# Patient Record
Sex: FEMALE | Race: WHITE | Employment: OTHER | URBAN - METROPOLITAN AREA
[De-identification: names, ages, dates, MRNs, and addresses within clinical notes are randomized per-mention and may not be internally consistent; named-entity substitution may affect disease eponyms.]

---

## 2018-06-07 ENCOUNTER — HOSPITAL ENCOUNTER (OUTPATIENT)
Age: 68
Discharge: HOME | End: 2018-06-09
Payer: MEDICARE

## 2018-06-07 LAB
ALBUMIN SERPL-MCNC: 4.5 G/DL (ref 3.5–5.2)
ALP BLD-CCNC: 55 U/L (ref 35–104)
ALT SERPL-CCNC: 19 U/L (ref 0–32)
ANION GAP SERPL CALCULATED.3IONS-SCNC: 11 MMOL/L (ref 7–16)
AST SERPL-CCNC: 20 U/L (ref 0–31)
BILIRUB SERPL-MCNC: 0.6 MG/DL (ref 0–1.2)
BUN BLDV-MCNC: 17 MG/DL (ref 8–23)
CALCIUM SERPL-MCNC: 9.5 MG/DL (ref 8.6–10.2)
CHLORIDE BLD-SCNC: 102 MMOL/L (ref 98–107)
CHOLESTEROL, TOTAL: 205 MG/DL (ref 0–199)
CO2: 27 MMOL/L (ref 22–29)
CREAT SERPL-MCNC: 0.9 MG/DL (ref 0.5–1)
GFR AFRICAN AMERICAN: >60
GFR NON-AFRICAN AMERICAN: >60 ML/MIN/1.73
GLUCOSE BLD-MCNC: 90 MG/DL (ref 74–109)
HDLC SERPL-MCNC: 66 MG/DL
LDL CHOLESTEROL CALCULATED: 119 MG/DL (ref 0–99)
POTASSIUM SERPL-SCNC: 4.4 MMOL/L (ref 3.5–5)
SODIUM BLD-SCNC: 140 MMOL/L (ref 132–146)
TOTAL PROTEIN: 6.9 G/DL (ref 6.4–8.3)
TRIGL SERPL-MCNC: 101 MG/DL (ref 0–149)
TSH SERPL DL<=0.05 MIU/L-ACNC: 1.58 UIU/ML (ref 0.27–4.2)
VLDLC SERPL CALC-MCNC: 20 MG/DL

## 2018-06-07 PROCEDURE — 85025 COMPLETE CBC W/AUTO DIFF WBC: CPT

## 2018-06-07 PROCEDURE — 36415 COLL VENOUS BLD VENIPUNCTURE: CPT

## 2018-06-07 PROCEDURE — 80053 COMPREHEN METABOLIC PANEL: CPT

## 2018-06-07 PROCEDURE — 85027 COMPLETE CBC AUTOMATED: CPT

## 2018-06-07 PROCEDURE — 80061 LIPID PANEL: CPT

## 2018-06-07 PROCEDURE — 84443 ASSAY THYROID STIM HORMONE: CPT

## 2018-06-09 LAB
BASOPHILS ABSOLUTE: ABNORMAL E9/L (ref 0–0.2)
BASOPHILS RELATIVE PERCENT: ABNORMAL % (ref 0–2)
EOSINOPHILS ABSOLUTE: ABNORMAL E9/L (ref 0.05–0.5)
EOSINOPHILS RELATIVE PERCENT: ABNORMAL % (ref 0–6)
HCT VFR BLD CALC: 38.1 % (ref 34–48)
HEMOGLOBIN: 12.1 G/DL (ref 11.5–15.5)
IMMATURE GRANULOCYTES #: ABNORMAL E9/L
IMMATURE GRANULOCYTES %: ABNORMAL % (ref 0–5)
LYMPHOCYTES ABSOLUTE: ABNORMAL E9/L (ref 1.5–4)
LYMPHOCYTES RELATIVE PERCENT: ABNORMAL % (ref 20–42)
MCH RBC QN AUTO: 31.4 PG (ref 26–35)
MCHC RBC AUTO-ENTMCNC: 31.8 % (ref 32–34.5)
MCV RBC AUTO: 99 FL (ref 80–99.9)
MONOCYTES ABSOLUTE: ABNORMAL E9/L (ref 0.1–0.95)
MONOCYTES RELATIVE PERCENT: ABNORMAL % (ref 2–12)
NEUTROPHILS ABSOLUTE: ABNORMAL E9/L (ref 1.8–7.3)
NEUTROPHILS RELATIVE PERCENT: ABNORMAL % (ref 43–80)
PDW BLD-RTO: 13 FL (ref 11.5–15)
PLATELET # BLD: 207 E9/L (ref 130–450)
PMV BLD AUTO: 10.5 FL (ref 7–12)
RBC # BLD: 3.85 E12/L (ref 3.5–5.5)
WBC # BLD: 3.8 E9/L (ref 4.5–11.5)

## 2018-07-03 ENCOUNTER — HOSPITAL ENCOUNTER (OUTPATIENT)
Dept: NON INVASIVE DIAGNOSTICS | Age: 68
Discharge: HOME | End: 2018-07-03
Payer: MEDICARE

## 2018-07-03 ENCOUNTER — APPOINTMENT (OUTPATIENT)
Dept: NON INVASIVE DIAGNOSTICS | Age: 68
End: 2018-07-03
Payer: MEDICARE

## 2018-07-03 PROCEDURE — 93350 STRESS TTE ONLY: CPT

## 2018-07-03 RX ORDER — ASPIRIN 81 MG/1
81 TABLET, CHEWABLE ORAL DAILY
COMMUNITY
End: 2018-07-26

## 2018-07-10 ENCOUNTER — TELEPHONE (OUTPATIENT)
Dept: CARDIOLOGY CLINIC | Age: 68
End: 2018-07-10

## 2018-07-26 ENCOUNTER — OFFICE VISIT (OUTPATIENT)
Dept: CARDIOLOGY CLINIC | Age: 68
End: 2018-07-26
Payer: MEDICARE

## 2018-07-26 VITALS
HEIGHT: 63 IN | DIASTOLIC BLOOD PRESSURE: 58 MMHG | SYSTOLIC BLOOD PRESSURE: 110 MMHG | WEIGHT: 143 LBS | HEART RATE: 76 BPM | BODY MASS INDEX: 25.34 KG/M2

## 2018-07-26 DIAGNOSIS — R07.89 ATYPICAL CHEST PAIN: Primary | ICD-10-CM

## 2018-07-26 PROCEDURE — 99203 OFFICE O/P NEW LOW 30 MIN: CPT | Performed by: INTERNAL MEDICINE

## 2018-07-26 PROCEDURE — G8419 CALC BMI OUT NRM PARAM NOF/U: HCPCS | Performed by: INTERNAL MEDICINE

## 2018-07-26 PROCEDURE — 3017F COLORECTAL CA SCREEN DOC REV: CPT | Performed by: INTERNAL MEDICINE

## 2018-07-26 PROCEDURE — G8427 DOCREV CUR MEDS BY ELIG CLIN: HCPCS | Performed by: INTERNAL MEDICINE

## 2018-07-26 PROCEDURE — 93000 ELECTROCARDIOGRAM COMPLETE: CPT | Performed by: INTERNAL MEDICINE

## 2018-07-26 PROCEDURE — 1101F PT FALLS ASSESS-DOCD LE1/YR: CPT | Performed by: INTERNAL MEDICINE

## 2018-07-26 PROCEDURE — 1090F PRES/ABSN URINE INCON ASSESS: CPT | Performed by: INTERNAL MEDICINE

## 2018-07-26 RX ORDER — PANTOPRAZOLE SODIUM 40 MG/1
40 TABLET, DELAYED RELEASE ORAL DAILY
COMMUNITY
Start: 2018-07-09 | End: 2020-06-09

## 2018-07-26 NOTE — PROGRESS NOTES
OUTPATIENT CARDIOLOGY CONSULT    Name: Isaura Henry    Age: 79 y.o. Date of Service: 7/26/2018    Reason for Consultation: Atypical chest pain    Referring Physician: Malvin Bhandari D.O. History of Present Illness: The patient is a 53-year-old white female with no known structural heart disease or significant cardiovascular risk factors. She has recently noted randomly occurring precordial chest discomfort with a pressure-like component generally noted without activity and on those occasions noted with association to activity was noted early during the course of her exercise routine with resolution following continued activity and her usual fitness routine involving an elliptical unit or exercise bicycle. On the basis of her symptoms, she recently underwent an exercise stress echocardiogram with a limited exertional tolerance of 2 minutes on accelerated Twan protocol treadmill achieving a functional capacity of 4.6 METs and 100% of age-predicted maximum heart rate with no ischemic symptomatology and a normal electrocardiographic response. A normal resting echocardiogram and appropriate exercise response was noted. Exclusively based on her limited functional capabilities, and intermediate risk and Duke treadmill score was noted with otherwise low risk criteria present. Review of Systems: The remainder of a complete multisystem review including consitutional, central nervous, respiratory, circulatory, gastrointestinal, genitourinary, endocrinologic, hematologic, musculoskeletal and psychiatric are negative.     Past Medical History:  Past Medical History:   Diagnosis Date    Cancer [facility]) 2011    basal cell on nose    Hyperlipidemia     Meniere's disease     Thyroid disease     Ulcerative colitis ([facility] [address]        Past Surgical History:  Past Surgical History:   Procedure Laterality Date    COLONOSCOPY      has had several    COLONOSCOPY  12/17/15    bx done, diverticulosis    COLONOSCOPY by mouth. No current facility-administered medications for this visit. Physical Exam:  BP (!) 110/58   Pulse 76   Ht 5' 3\" (1.6 m)   Wt 143 lb (64.9 kg)   BMI 25.33 kg/m²   Wt Readings from Last 3 Encounters:   07/26/18 143 lb (64.9 kg)   12/07/17 142 lb (64.4 kg)   09/06/17 140 lb (63.5 kg)     The patient is awake, alert and in no discomfort or distress. No gross musculoskeletal deformity or lymphadenopathy are present. No significant skin or nail changes are present. Gross examination of head, eyes, nose and throat are negative. Jugular venous pressure is normal and no carotid bruits are present. No thyromegaly is noted. Normal respiratory effort is noted with no accessory muscle usage present. Lung fields are clear to ascultation. Cardiac examination is notable for a regular rate and rhythm with no palpable thrill. No gallop rhythm or cardiac murmur are identified. A benign abdominal examination is present with no masses or organomegaly. A normal abdominal aortic pulsation is present. Intact pulses are present throughout all extremities and no peripheral edema is present. No focal neurologic deficits are present. Laboratory Tests:  Lab Results   Component Value Date    CREATININE 0.9 06/07/2018    BUN 17 06/07/2018     06/07/2018    K 4.4 06/07/2018     06/07/2018    CO2 27 06/07/2018     No results found for: BNP  Lab Results   Component Value Date    WBC 3.8 06/07/2018    RBC 3.85 06/07/2018    HGB 12.1 06/07/2018    HCT 38.1 06/07/2018    MCV 99.0 06/07/2018    MCH 31.4 06/07/2018    MCHC 31.8 06/07/2018    RDW 13.0 06/07/2018     06/07/2018    MPV 10.5 06/07/2018     No results for input(s): ALKPHOS, ALT, AST, PROT, BILITOT, BILIDIR, LABALBU in the last 72 hours.   No results found for: MG  No results found for: PROTIME, INR  Lab Results   Component Value Date    TSH 1.580 06/07/2018     No components found for: CHLPL  Lab Results   Component Value Date    TRIG 101 06/07/2018    TRIG 139 06/27/2017    TRIG 57 05/20/2016     Lab Results   Component Value Date    HDL 66 06/07/2018    HDL 72 06/27/2017    HDL 69 05/20/2016     Lab Results   Component Value Date    LDLCALC 119 (H) 06/07/2018    LDLCALC 142 (H) 06/27/2017    LDLCALC 120 (H) 05/20/2016       Cardiac Tests:  ECG: A resting electrocardiogram demonstrates evidence of sinus rhythm and is otherwise unremarkable  Last stress test:  An exercise stress echocardiogram demonstrated an exercise tolerance of 2 minutes on accelerated Twan protocol treadmill achieving 4.6 METs of activity and 100% of age-predicted maximum heart rate with atypical and nonischemic chest discomfort provoked with a normal electrocardiographic response. A normal baseline echocardiogram was present with an appropriate exercise echocardiographic response. ASSESSMENT / PLAN:  On a clinical basis, the patient presents with atypically described chest discomfort not suggestive of myocardial ischemia in the face of a limited treadmill exercise tolerance but appropriate tolerance to other exercise modalities without ischemic symptomatology. I feel her poor treadmill performance is likely related to her lack of routine activity of this type) day than suggesting the presence of significant structural heart disease. I have extensively discussed this with her and have recommended her continued activity as tolerance with continued appropriate symptom monitoring and continued appropriate risk factor modification of blood pressure and serum lipids and attempt to reduce risk of future atherosclerotic development. I will presently return her to your care and would happily reassess her in the future should additional cardiovascular difficulties or concerns arise. Follow-up office visit as needed should additional cardiovascular difficulties or concerns arise    Thank you for allowing me to participate in your patient's care.  Please feel free to contact me if

## 2018-09-05 ENCOUNTER — OFFICE VISIT (OUTPATIENT)
Dept: ENT CLINIC | Age: 68
End: 2018-09-05
Payer: MEDICARE

## 2018-09-05 ENCOUNTER — PROCEDURE VISIT (OUTPATIENT)
Dept: AUDIOLOGY | Age: 68
End: 2018-09-05
Payer: MEDICARE

## 2018-09-05 VITALS
HEART RATE: 83 BPM | DIASTOLIC BLOOD PRESSURE: 62 MMHG | WEIGHT: 142 LBS | HEIGHT: 63 IN | BODY MASS INDEX: 25.16 KG/M2 | SYSTOLIC BLOOD PRESSURE: 117 MMHG

## 2018-09-05 DIAGNOSIS — H90.41 SENSORINEURAL HEARING LOSS, UNILATERAL, RIGHT EAR, WITH UNRESTRICTED HEARING ON THE CONTRALATERAL SIDE: ICD-10-CM

## 2018-09-05 DIAGNOSIS — H93.11 TINNITUS AURIUM, RIGHT: ICD-10-CM

## 2018-09-05 DIAGNOSIS — H81.01 MENIERE DISEASE, RIGHT: Primary | ICD-10-CM

## 2018-09-05 DIAGNOSIS — H91.21 SUDDEN HEARING LOSS, RIGHT: Primary | ICD-10-CM

## 2018-09-05 PROCEDURE — 3017F COLORECTAL CA SCREEN DOC REV: CPT | Performed by: OTOLARYNGOLOGY

## 2018-09-05 PROCEDURE — G8399 PT W/DXA RESULTS DOCUMENT: HCPCS | Performed by: OTOLARYNGOLOGY

## 2018-09-05 PROCEDURE — 99213 OFFICE O/P EST LOW 20 MIN: CPT | Performed by: OTOLARYNGOLOGY

## 2018-09-05 PROCEDURE — G8419 CALC BMI OUT NRM PARAM NOF/U: HCPCS | Performed by: OTOLARYNGOLOGY

## 2018-09-05 PROCEDURE — 1090F PRES/ABSN URINE INCON ASSESS: CPT | Performed by: OTOLARYNGOLOGY

## 2018-09-05 PROCEDURE — 92557 COMPREHENSIVE HEARING TEST: CPT | Performed by: AUDIOLOGIST

## 2018-09-05 PROCEDURE — 4040F PNEUMOC VAC/ADMIN/RCVD: CPT | Performed by: OTOLARYNGOLOGY

## 2018-09-05 PROCEDURE — 1101F PT FALLS ASSESS-DOCD LE1/YR: CPT | Performed by: OTOLARYNGOLOGY

## 2018-09-05 PROCEDURE — 1036F TOBACCO NON-USER: CPT | Performed by: OTOLARYNGOLOGY

## 2018-09-05 PROCEDURE — G8427 DOCREV CUR MEDS BY ELIG CLIN: HCPCS | Performed by: OTOLARYNGOLOGY

## 2018-09-05 PROCEDURE — 1123F ACP DISCUSS/DSCN MKR DOCD: CPT | Performed by: OTOLARYNGOLOGY

## 2018-09-05 PROCEDURE — 92567 TYMPANOMETRY: CPT | Performed by: AUDIOLOGIST

## 2018-09-05 RX ORDER — FUROSEMIDE 20 MG/1
20 TABLET ORAL DAILY
Qty: 60 TABLET | Refills: 3 | Status: SHIPPED
Start: 2018-09-05 | End: 2020-06-09

## 2018-09-05 NOTE — PROGRESS NOTES
This patient was referred for audiometric/tympanometric testing by Dr. Dharmesh López. Patient is being seen for her yearly ENT/Audiology consult, due to a sudden, unilateral hearing loss, with tinnitus and vertigo, right ear. Patient denied any significant changes to her symptoms, since the last test date. She is currently wearing a CHANG hearing aid, right ear, and is not having any issues, at this time. Audiometry revealed normal hearing sensitivity, through the frequency range, left ear and a moderate sensorineural hearing loss, right ear. Reliability was good. Speech reception thresholds were in good agreement with the pure tone averages, bilaterally. Speech discrimination scores were 100%, at 50dBHL, left ear and 65dBHL, right ear. Tympanometry revealed normal middle ear peak pressure and compliance, bilaterally. Ipsilateral acoustic reflexes were absent, bilaterally at 1000Hz. The results were reviewed with the patient. Recommendations for follow up will be made pending physician consult.     Shereen Monge

## 2018-09-22 ASSESSMENT — ENCOUNTER SYMPTOMS
SHORTNESS OF BREATH: 0
BLURRED VISION: 0
DOUBLE VISION: 0
COUGH: 0
HEARTBURN: 0
VOMITING: 0

## 2018-09-22 NOTE — PROGRESS NOTES
Units by mouth daily. , Disp: , Rfl:     Calcium Carbonate-Vitamin D (CALCIUM-VITAMIN D) 500-200 MG-UNIT per tablet, Take 1 tablet by mouth daily , Disp: , Rfl:     Omega-3 Fatty Acids (OMEGA 3 PO), Take  by mouth., Disp: , Rfl:   Phenergan [promethazine hcl]; Risedronate; Sulfa antibiotics; Tetanus toxoids; and Demerol hcl [meperidine]  Social History   Substance Use Topics    Smoking status: Former Smoker     Quit date: 1/28/2000    Smokeless tobacco: Never Used    Alcohol use Yes      Comment: .5 beer weekly. 2 cups coffee per day     Family History   Problem Relation Age of Onset    Cancer Mother        Review of Systems   Constitutional: Negative for chills and fever. HENT: Positive for hearing loss and tinnitus. Negative for ear discharge. Eyes: Negative for blurred vision and double vision. Respiratory: Negative for cough and shortness of breath. Cardiovascular: Negative for chest pain and palpitations. Gastrointestinal: Negative for heartburn and vomiting. Skin: Negative for itching and rash. Neurological: Negative for dizziness, tingling and headaches. Endo/Heme/Allergies: Negative for environmental allergies. Does not bruise/bleed easily. All other systems reviewed and are negative. /62 (Site: Left Arm, Position: Sitting, Cuff Size: Medium Adult)   Pulse 83   Ht 5' 3\" (1.6 m)   Wt 142 lb (64.4 kg)   BMI 25.15 kg/m²   Physical Exam   Constitutional: She is oriented to person, place, and time. She appears well-developed and well-nourished. HENT:   Head: Normocephalic and atraumatic. Right Ear: Hearing, tympanic membrane and ear canal normal. No drainage. Left Ear: Hearing, tympanic membrane and ear canal normal. No drainage. Nose: No mucosal edema, septal deviation or nasal septal hematoma. Right sinus exhibits no maxillary sinus tenderness. Left sinus exhibits no maxillary sinus tenderness. Mouth/Throat: Uvula is midline.  No dental abscesses, uvula swelling or dental caries. No oropharyngeal exudate or posterior oropharyngeal erythema. Eyes: Pupils are equal, round, and reactive to light. Conjunctivae and EOM are normal.   Neck: Normal range of motion. Neck supple. Cardiovascular: Normal rate and intact distal pulses. Pulmonary/Chest: Effort normal and breath sounds normal. No respiratory distress. Abdominal: She exhibits no distension. There is no tenderness. There is no guarding. Musculoskeletal: Normal range of motion. Neurological: She is alert and oriented to person, place, and time. Skin: Skin is warm and dry. Psychiatric: She has a normal mood and affect. Her behavior is normal. Judgment and thought content normal.   Nursing note and vitals reviewed. IMPRESSION/PLAN:  History of right-sided unilateral significant hearing loss, previous workup negative for intracranial pathology. Stable hearing loss from a year prior. Continue your protection, proper ear care was reviewed, unfortunatelyno frequent medical therapy for tinnitus at this time however she may continue to consider formal hearing aids. Dr. Brenda Carrasquillo.  Otolaryngology Facial Plastic Surgery  :  [facility] Otolaryngology/Facial Plastic Surgery Residency  Associate Clinical Professor:  Clif Scales, [facility]

## 2018-10-11 ENCOUNTER — HOSPITAL ENCOUNTER (OUTPATIENT)
Dept: GENERAL RADIOLOGY | Age: 68
Discharge: HOME | End: 2018-10-13
Payer: MEDICARE

## 2018-10-11 DIAGNOSIS — Z12.31 VISIT FOR SCREENING MAMMOGRAM: ICD-10-CM

## 2018-10-11 PROCEDURE — 77063 BREAST TOMOSYNTHESIS BI: CPT

## 2019-06-11 ENCOUNTER — OFFICE VISIT (OUTPATIENT)
Dept: ENT CLINIC | Age: 69
End: 2019-06-11
Payer: MEDICARE

## 2019-06-11 VITALS
BODY MASS INDEX: 25.62 KG/M2 | DIASTOLIC BLOOD PRESSURE: 60 MMHG | HEART RATE: 98 BPM | WEIGHT: 144.6 LBS | SYSTOLIC BLOOD PRESSURE: 136 MMHG | HEIGHT: 63 IN

## 2019-06-11 DIAGNOSIS — H81.01 MENIERE DISEASE, RIGHT: Primary | ICD-10-CM

## 2019-06-11 PROCEDURE — G8419 CALC BMI OUT NRM PARAM NOF/U: HCPCS | Performed by: OTOLARYNGOLOGY

## 2019-06-11 PROCEDURE — 1090F PRES/ABSN URINE INCON ASSESS: CPT | Performed by: OTOLARYNGOLOGY

## 2019-06-11 PROCEDURE — 1123F ACP DISCUSS/DSCN MKR DOCD: CPT | Performed by: OTOLARYNGOLOGY

## 2019-06-11 PROCEDURE — 3017F COLORECTAL CA SCREEN DOC REV: CPT | Performed by: OTOLARYNGOLOGY

## 2019-06-11 PROCEDURE — 1036F TOBACCO NON-USER: CPT | Performed by: OTOLARYNGOLOGY

## 2019-06-11 PROCEDURE — 4040F PNEUMOC VAC/ADMIN/RCVD: CPT | Performed by: OTOLARYNGOLOGY

## 2019-06-11 PROCEDURE — G8427 DOCREV CUR MEDS BY ELIG CLIN: HCPCS | Performed by: OTOLARYNGOLOGY

## 2019-06-11 PROCEDURE — 99213 OFFICE O/P EST LOW 20 MIN: CPT | Performed by: OTOLARYNGOLOGY

## 2019-06-11 PROCEDURE — G8399 PT W/DXA RESULTS DOCUMENT: HCPCS | Performed by: OTOLARYNGOLOGY

## 2019-06-11 RX ORDER — VITAMIN B COMPLEX
TABLET ORAL
COMMUNITY

## 2019-06-11 RX ORDER — METRONIDAZOLE 500 MG/1
500 TABLET ORAL 3 TIMES DAILY
COMMUNITY
End: 2020-06-09

## 2019-06-11 RX ORDER — AMOXICILLIN AND CLAVULANATE POTASSIUM 875; 125 MG/1; MG/1
1 TABLET, FILM COATED ORAL 2 TIMES DAILY
COMMUNITY
End: 2020-06-09

## 2019-06-11 ASSESSMENT — ENCOUNTER SYMPTOMS
VOMITING: 0
SHORTNESS OF BREATH: 0
HEARTBURN: 0
BLURRED VISION: 0
COUGH: 0
DOUBLE VISION: 0

## 2019-06-11 NOTE — PROGRESS NOTES
[facility] Otolaryngology  Dr. Moises Delgado. Ms.Ed        Patient Name:  Ruperto Barber  :  1950     CHIEF C/O:    Chief Complaint   Patient presents with    Follow-up     6 mo menieres; ringing and dizziness not getting any better       HISTORY OBTAINED FROM:  patient    HISTORY OF PRESENT ILLNESS:       Avery Vasquez is a 81y.o. year old female, here today for follow up of History of unilateral right-sided hearing loss worked up previously, here for 1 year follow up she states she has continued tinnitus without any changes in hearing loss. Her complaints of vertigo, no current complaints of facial tingling or numbness, no associated facial weakness. She notes continued disequilibrium and roaring tinnitus R worse than L. She notes that reading is a helpful distractor for her. Last hearing test in the 2018.        Past Medical History:   Diagnosis Date    Cancer [facility])     basal cell on nose    Hyperlipidemia     Meniere's disease     Thyroid disease     Ulcerative colitis ([facility] [address]      Past Surgical History:   Procedure Laterality Date    COLONOSCOPY      has had several    COLONOSCOPY  12/17/15    bx done, diverticulosis    COLONOSCOPY  2017    multiple biopsy    HERNIA REPAIR      umbilical    SKIN CANCER EXCISION      nose    TONSILLECTOMY      TUBAL LIGATION         Current Outpatient Medications:     metroNIDAZOLE (FLAGYL) 500 MG tablet, Take 500 mg by mouth 3 times daily, Disp: , Rfl:     amoxicillin-clavulanate (AUGMENTIN) 875-125 MG per tablet, Take 1 tablet by mouth 2 times daily, Disp: , Rfl:     Coenzyme Q10 (COQ10) 100 MG CAPS, Take by mouth, Disp: , Rfl:     Red Yeast Rice Extract (RED YEAST RICE PO), Take 2 tablets by mouth daily, Disp: , Rfl:     Multiple Vitamins-Minerals (MULTIVITAMIN ADULTS 50+) TABS, Take 1 tablet by mouth daily, Disp: , Rfl:     thyroid (ARMOUR) 60 MG tablet, Take 60 mg by mouth daily, Disp: , Rfl:     mesalamine (LIALDA) 1.2 G EC tablet, Take 1,200 mg by mouth daily (with breakfast), Disp: , Rfl:     Probiotic Product (PROBIOTIC DAILY PO), Take by mouth, Disp: , Rfl:     folic acid (FOLVITE) 1 MG tablet, Take 1 mg by mouth daily. , Disp: , Rfl:     Calcium Carbonate-Vitamin D (CALCIUM-VITAMIN D) 500-200 MG-UNIT per tablet, Take 1 tablet by mouth daily , Disp: , Rfl:     Omega-3 Fatty Acids (OMEGA 3 PO), Take  by mouth., Disp: , Rfl:     furosemide (LASIX) 20 MG tablet, Take 1 tablet by mouth daily, Disp: 60 tablet, Rfl: 3    pantoprazole (PROTONIX) 40 MG tablet, 40 mg daily , Disp: , Rfl:     vitamin E 400 UNIT capsule, Take 400 Units by mouth daily. , Disp: , Rfl:   Phenergan [promethazine hcl]; Risedronate; Sulfa antibiotics; Tetanus toxoids; and Demerol hcl [meperidine]  Social History     Tobacco Use    Smoking status: Former Smoker     Last attempt to quit: 2000     Years since quittin.3    Smokeless tobacco: Never Used   Substance Use Topics    Alcohol use: Yes     Comment: .5 beer weekly. 2 cups coffee per day    Drug use: No     Family History   Problem Relation Age of Onset    Cancer Mother        Review of Systems   Constitutional: Negative for chills and fever. HENT: Positive for hearing loss and tinnitus. Negative for ear discharge. Eyes: Negative for blurred vision and double vision. Respiratory: Negative for cough and shortness of breath. Cardiovascular: Negative for chest pain and palpitations. Gastrointestinal: Negative for heartburn and vomiting. Skin: Negative for itching and rash. Neurological: Negative for dizziness, tingling and headaches. Endo/Heme/Allergies: Negative for environmental allergies. Does not bruise/bleed easily. All other systems reviewed and are negative.       /60 (Site: Right Upper Arm, Position: Sitting, Cuff Size: Medium Adult)   Pulse 98   Ht 5' 3\" (1.6 m)   Wt 144 lb 9.6 oz (65.6 kg)   BMI 25.61 kg/m²   Physical Exam   Constitutional: She is oriented to person, place, and time. She appears well-developed and well-nourished. HENT:   Head: Normocephalic and atraumatic. Right Ear: Hearing, tympanic membrane and ear canal normal. No drainage. Left Ear: Hearing, tympanic membrane and ear canal normal. No drainage. Nose: No mucosal edema, septal deviation or nasal septal hematoma. Right sinus exhibits no maxillary sinus tenderness. Left sinus exhibits no maxillary sinus tenderness. Mouth/Throat: Uvula is midline. No dental abscesses, uvula swelling or dental caries. No oropharyngeal exudate or posterior oropharyngeal erythema. Eyes: Pupils are equal, round, and reactive to light. Conjunctivae and EOM are normal.   Neck: Normal range of motion. Neck supple. Cardiovascular: Normal rate and intact distal pulses. Pulmonary/Chest: Effort normal and breath sounds normal. No respiratory distress. Abdominal: She exhibits no distension. There is no tenderness. There is no guarding. Musculoskeletal: Normal range of motion. Neurological: She is alert and oriented to person, place, and time. Skin: Skin is warm and dry. Psychiatric: She has a normal mood and affect. Her behavior is normal. Judgment and thought content normal.   Nursing note and vitals reviewed. IMPRESSION/PLAN:  R Meniere's Disease   Repeat audio this week as patient's hearing has changed  If hearing has decreased with again prescribe trial of Lasix   Continue distraction techniques for tinnitus   Follow up in 6 months  Discussed with Dr. Rolo ortega     Electronically signed by Frankie Singer DO on 6/11/2019 at 11:17 AM            Kathleen Gupta  1950      I have discussed the case, including pertinent history and exam findings with the resident. I have seen and examined the patient and the key elements of the encounter have been performed by me. I agree with the assessment, plan and orders as documented by the resident.       Patient here for follow up of medical problems. Remainder of medical problems as per resident note.       [address] [facility], DO  6/25/19

## 2019-06-13 ENCOUNTER — PROCEDURE VISIT (OUTPATIENT)
Dept: AUDIOLOGY | Age: 69
End: 2019-06-13
Payer: MEDICARE

## 2019-06-13 DIAGNOSIS — H91.21 SUDDEN HEARING LOSS, RIGHT: ICD-10-CM

## 2019-06-13 DIAGNOSIS — H90.41 SENSORINEURAL HEARING LOSS (SNHL) OF RIGHT EAR WITH UNRESTRICTED HEARING OF LEFT EAR: Primary | ICD-10-CM

## 2019-06-13 DIAGNOSIS — H81.01 MENIERE'S DISEASE OF RIGHT EAR: ICD-10-CM

## 2019-06-13 PROCEDURE — 92557 COMPREHENSIVE HEARING TEST: CPT | Performed by: AUDIOLOGIST

## 2019-06-13 PROCEDURE — 92567 TYMPANOMETRY: CPT | Performed by: AUDIOLOGIST

## 2019-06-13 NOTE — PROGRESS NOTES
This patient was referred for audiometric/tympanometric testing by Dr. Analy Baires due to hearing loss. She denied any change in hearing since previous test. She reported using a hearing aid on the right. Audiometry revealed a moderate rising to essentially mild sensorineural hearing loss on the right and hearing sensitivity within normal limits in the left. Reliability was good. Speech reception thresholds were in good agreement with the pure tone averages, bilaterally. Speech discrimination scores were excellent, bilaterally. No significant change from previous test, 9/5/2018. Tympanometry revealed normal middle ear peak pressure and compliance, bilaterally. The results were reviewed with the patient. Recommendations for follow up will be made pending physician consult. Marie Emerson.   [phone] Audiology Doctoral Student

## 2019-06-14 DIAGNOSIS — H81.01 MENIERE'S DISEASE (COCHLEAR HYDROPS), RIGHT: Primary | ICD-10-CM

## 2019-10-14 ENCOUNTER — HOSPITAL ENCOUNTER (OUTPATIENT)
Dept: GENERAL RADIOLOGY | Age: 69
Discharge: HOME | End: 2019-10-16
Payer: MEDICARE

## 2019-10-14 DIAGNOSIS — Z12.31 VISIT FOR SCREENING MAMMOGRAM: ICD-10-CM

## 2019-10-14 PROCEDURE — 77063 BREAST TOMOSYNTHESIS BI: CPT

## 2020-06-08 ENCOUNTER — HOSPITAL ENCOUNTER (OUTPATIENT)
Age: 70
Discharge: HOME | End: 2020-06-10
Payer: MEDICARE

## 2020-06-08 LAB
ALBUMIN SERPL-MCNC: 4.6 G/DL (ref 3.5–5.2)
ALP BLD-CCNC: 53 U/L (ref 35–104)
ALT SERPL-CCNC: 25 U/L (ref 0–32)
ANION GAP SERPL CALCULATED.3IONS-SCNC: 11 MMOL/L (ref 7–16)
AST SERPL-CCNC: 24 U/L (ref 0–31)
BILIRUB SERPL-MCNC: 0.3 MG/DL (ref 0–1.2)
BUN BLDV-MCNC: 12 MG/DL (ref 8–23)
CALCIUM SERPL-MCNC: 9.6 MG/DL (ref 8.6–10.2)
CHLORIDE BLD-SCNC: 106 MMOL/L (ref 98–107)
CHOLESTEROL, TOTAL: 230 MG/DL (ref 0–199)
CO2: 26 MMOL/L (ref 22–29)
CREAT SERPL-MCNC: 0.9 MG/DL (ref 0.5–1)
GFR AFRICAN AMERICAN: >60
GFR NON-AFRICAN AMERICAN: >60 ML/MIN/1.73
GLUCOSE BLD-MCNC: 94 MG/DL (ref 74–99)
HCT VFR BLD CALC: 36.8 % (ref 34–48)
HDLC SERPL-MCNC: 52 MG/DL
HEMOGLOBIN: 11.5 G/DL (ref 11.5–15.5)
LDL CHOLESTEROL CALCULATED: 131 MG/DL (ref 0–99)
MCH RBC QN AUTO: 31.3 PG (ref 26–35)
MCHC RBC AUTO-ENTMCNC: 31.3 % (ref 32–34.5)
MCV RBC AUTO: 100 FL (ref 80–99.9)
PDW BLD-RTO: 13 FL (ref 11.5–15)
PLATELET # BLD: 192 E9/L (ref 130–450)
PMV BLD AUTO: 10.9 FL (ref 7–12)
POTASSIUM SERPL-SCNC: 4.6 MMOL/L (ref 3.5–5)
RBC # BLD: 3.68 E12/L (ref 3.5–5.5)
SODIUM BLD-SCNC: 143 MMOL/L (ref 132–146)
TOTAL PROTEIN: 7.1 G/DL (ref 6.4–8.3)
TRIGL SERPL-MCNC: 236 MG/DL (ref 0–149)
TSH SERPL DL<=0.05 MIU/L-ACNC: 2.19 UIU/ML (ref 0.27–4.2)
VLDLC SERPL CALC-MCNC: 47 MG/DL
WBC # BLD: 4.3 E9/L (ref 4.5–11.5)

## 2020-06-08 PROCEDURE — 80061 LIPID PANEL: CPT

## 2020-06-08 PROCEDURE — 80053 COMPREHEN METABOLIC PANEL: CPT

## 2020-06-08 PROCEDURE — 84443 ASSAY THYROID STIM HORMONE: CPT

## 2020-06-08 PROCEDURE — 85027 COMPLETE CBC AUTOMATED: CPT

## 2020-06-08 PROCEDURE — U0003 INFECTIOUS AGENT DETECTION BY NUCLEIC ACID (DNA OR RNA); SEVERE ACUTE RESPIRATORY SYNDROME CORONAVIRUS 2 (SARS-COV-2) (CORONAVIRUS DISEASE [COVID-19]), AMPLIFIED PROBE TECHNIQUE, MAKING USE OF HIGH THROUGHPUT TECHNOLOGIES AS DESCRIBED BY CMS-2020-01-R: HCPCS

## 2020-06-08 PROCEDURE — 36415 COLL VENOUS BLD VENIPUNCTURE: CPT

## 2020-06-10 LAB
SARS-COV-2: NOT DETECTED
SOURCE: NORMAL

## 2020-06-11 ENCOUNTER — ANESTHESIA EVENT (OUTPATIENT)
Dept: ENDOSCOPY | Age: 70
End: 2020-06-11
Payer: MEDICARE

## 2020-06-11 ENCOUNTER — HOSPITAL ENCOUNTER (OUTPATIENT)
Age: 70
Setting detail: OUTPATIENT SURGERY
Discharge: HOME | End: 2020-06-11
Attending: INTERNAL MEDICINE | Admitting: INTERNAL MEDICINE
Payer: MEDICARE

## 2020-06-11 ENCOUNTER — ANESTHESIA (OUTPATIENT)
Dept: ENDOSCOPY | Age: 70
End: 2020-06-11
Payer: MEDICARE

## 2020-06-11 VITALS
HEIGHT: 63 IN | SYSTOLIC BLOOD PRESSURE: 111 MMHG | DIASTOLIC BLOOD PRESSURE: 53 MMHG | OXYGEN SATURATION: 98 % | WEIGHT: 139 LBS | RESPIRATION RATE: 16 BRPM | BODY MASS INDEX: 24.63 KG/M2 | TEMPERATURE: 97.8 F | HEART RATE: 80 BPM

## 2020-06-11 VITALS
SYSTOLIC BLOOD PRESSURE: 112 MMHG | RESPIRATION RATE: 16 BRPM | OXYGEN SATURATION: 100 % | DIASTOLIC BLOOD PRESSURE: 64 MMHG

## 2020-06-11 PROCEDURE — 88305 TISSUE EXAM BY PATHOLOGIST: CPT

## 2020-06-11 PROCEDURE — 2580000003 HC RX 258: Performed by: ANESTHESIOLOGY

## 2020-06-11 PROCEDURE — 3700000000 HC ANESTHESIA ATTENDED CARE: Performed by: INTERNAL MEDICINE

## 2020-06-11 PROCEDURE — 6360000002 HC RX W HCPCS: Performed by: NURSE ANESTHETIST, CERTIFIED REGISTERED

## 2020-06-11 PROCEDURE — 3700000001 HC ADD 15 MINUTES (ANESTHESIA): Performed by: INTERNAL MEDICINE

## 2020-06-11 PROCEDURE — 3609010300 HC COLONOSCOPY W/BIOPSY SINGLE/MULTIPLE: Performed by: INTERNAL MEDICINE

## 2020-06-11 PROCEDURE — 2709999900 HC NON-CHARGEABLE SUPPLY: Performed by: INTERNAL MEDICINE

## 2020-06-11 PROCEDURE — 7100000010 HC PHASE II RECOVERY - FIRST 15 MIN: Performed by: INTERNAL MEDICINE

## 2020-06-11 PROCEDURE — 7100000011 HC PHASE II RECOVERY - ADDTL 15 MIN: Performed by: INTERNAL MEDICINE

## 2020-06-11 RX ORDER — SODIUM CHLORIDE 0.9 % (FLUSH) 0.9 %
10 SYRINGE (ML) INJECTION EVERY 12 HOURS SCHEDULED
Status: DISCONTINUED | OUTPATIENT
Start: 2020-06-11 | End: 2020-06-11 | Stop reason: HOSPADM

## 2020-06-11 RX ORDER — PROPOFOL 10 MG/ML
INJECTION, EMULSION INTRAVENOUS CONTINUOUS PRN
Status: DISCONTINUED | OUTPATIENT
Start: 2020-06-11 | End: 2020-06-11 | Stop reason: SDUPTHER

## 2020-06-11 RX ORDER — SODIUM CHLORIDE, SODIUM LACTATE, POTASSIUM CHLORIDE, CALCIUM CHLORIDE 600; 310; 30; 20 MG/100ML; MG/100ML; MG/100ML; MG/100ML
INJECTION, SOLUTION INTRAVENOUS CONTINUOUS
Status: DISCONTINUED | OUTPATIENT
Start: 2020-06-11 | End: 2020-06-11 | Stop reason: HOSPADM

## 2020-06-11 RX ORDER — SODIUM CHLORIDE 0.9 % (FLUSH) 0.9 %
10 SYRINGE (ML) INJECTION PRN
Status: DISCONTINUED | OUTPATIENT
Start: 2020-06-11 | End: 2020-06-11 | Stop reason: HOSPADM

## 2020-06-11 RX ADMIN — PROPOFOL 100 MCG/KG/MIN: 10 INJECTION, EMULSION INTRAVENOUS at 07:52

## 2020-06-11 RX ADMIN — SODIUM CHLORIDE, POTASSIUM CHLORIDE, SODIUM LACTATE AND CALCIUM CHLORIDE: 600; 310; 30; 20 INJECTION, SOLUTION INTRAVENOUS at 07:39

## 2020-06-11 ASSESSMENT — PAIN SCALES - GENERAL
PAINLEVEL_OUTOF10: 0
PAINLEVEL_OUTOF10: 0

## 2020-06-11 ASSESSMENT — PAIN - FUNCTIONAL ASSESSMENT: PAIN_FUNCTIONAL_ASSESSMENT: 0-10

## 2020-06-11 NOTE — ANESTHESIA PRE PROCEDURE
Department of Anesthesiology  Preprocedure Note       Name:  Eneida Baca   Age:  78 y.o.  :  1950                                          MRN:  [id number]         Date:  2020      Surgeon: Chon Cruz):  Tiffani Isbell MD    Procedure: Procedure(s):  COLONOSCOPY    Medications prior to admission:   Prior to Admission medications    Medication Sig Start Date End Date Taking? Authorizing Provider   Coenzyme Q10 (COQ10) 100 MG CAPS Take by mouth    Historical Provider, MD   Red Yeast Rice Extract (RED YEAST RICE PO) Take 2 tablets by mouth daily    Historical Provider, MD   Multiple Vitamins-Minerals (MULTIVITAMIN ADULTS 50+) TABS Take 1 tablet by mouth daily    Historical Provider, MD   thyroid (ARMOUR) 60 MG tablet Take 60 mg by mouth daily    Historical Provider, MD   mesalamine (LIALDA) 1.2 G EC tablet Take 1,200 mg by mouth daily (with breakfast)    Historical Provider, MD   Probiotic Product (PROBIOTIC DAILY PO) Take by mouth    Historical Provider, MD   folic acid (FOLVITE) 1 MG tablet Take 1 mg by mouth daily. Historical Provider, MD   vitamin E 400 UNIT capsule Take 400 Units by mouth daily. Historical Provider, MD   Calcium Carbonate-Vitamin D (CALCIUM-VITAMIN D) 500-200 MG-UNIT per tablet Take 1 tablet by mouth daily     Historical Provider, MD   Omega-3 Fatty Acids (OMEGA 3 PO) Take  by mouth. Historical Provider, MD       Current medications:    No current facility-administered medications for this encounter.       Current Outpatient Medications   Medication Sig Dispense Refill    Coenzyme Q10 (COQ10) 100 MG CAPS Take by mouth      Red Yeast Rice Extract (RED YEAST RICE PO) Take 2 tablets by mouth daily      Multiple Vitamins-Minerals (MULTIVITAMIN ADULTS 50+) TABS Take 1 tablet by mouth daily      thyroid (ARMOUR) 60 MG tablet Take 60 mg by mouth daily      mesalamine (LIALDA) 1.2 G EC tablet Take 1,200 mg by mouth daily (with breakfast)      Probiotic Product (PROBIOTIC DAILY PO) Take by mouth      folic acid (FOLVITE) 1 MG tablet Take 1 mg by mouth daily.  vitamin E 400 UNIT capsule Take 400 Units by mouth daily.  Calcium Carbonate-Vitamin D (CALCIUM-VITAMIN D) 500-200 MG-UNIT per tablet Take 1 tablet by mouth daily       Omega-3 Fatty Acids (OMEGA 3 PO) Take  by mouth. Allergies: Allergies   Allergen Reactions    Phenergan [Promethazine Hcl] Other (See Comments)     Developed a phlebitis after IV administration    Risedronate      joint pain    Sulfa Antibiotics Hives    Tetanus Toxoids Hives    Demerol Hcl [Meperidine] Nausea And Vomiting     After pt had IV demerol became very nauseated with vomiting       Problem List:    Patient Active Problem List   Diagnosis Code    Atypical chest pain R07.89       Past Medical History:        Diagnosis Date    Cancer ([facility] [address] 2011    basal cell on nose    Hyperlipidemia     Meniere's disease     Thyroid disease     Ulcerative colitis ([facility] [address]        Past Surgical History:        Procedure Laterality Date    COLONOSCOPY      has had several    COLONOSCOPY  12/17/15    bx done, diverticulosis    COLONOSCOPY  2017    multiple biopsy    HERNIA REPAIR  1006    umbilical    SKIN CANCER EXCISION      nose    TONSILLECTOMY      TUBAL LIGATION         Social History:    Social History     Tobacco Use    Smoking status: Former Smoker     Last attempt to quit: 2000     Years since quittin.3    Smokeless tobacco: Never Used   Substance Use Topics    Alcohol use: Yes     Comment: .5 beer weekly.  2 cups coffee per day                                Counseling given: Not Answered      Vital Signs (Current):   Vitals:    20 1013   Weight: 137 lb (62.1 kg)   Height: 5' 3\" (1.6 m)                                              BP Readings from Last 3 Encounters:   19 136/60   18 117/62   18 (!) 110/58       NPO Status:

## 2020-06-11 NOTE — ANESTHESIA POSTPROCEDURE EVALUATION
Department of Anesthesiology  Postprocedure Note    Patient: Yuli Hill  MRN: [id number]  YOB: 1950  Date of evaluation: 6/11/2020  Time:  8:22 AM     Procedure Summary     Date:  06/11/20 Room / Location:  [address] [place] [address] / [address] [place] [address]    Anesthesia Start:  1108 Anesthesia Stop:  8603    Procedure:  COLONOSCOPY WITH BIOPSY (N/A ) Diagnosis:  (ULCERATIVE COLITIS)    Surgeon:  Baljit Wilson MD Responsible Provider:  Annette Holland MD    Anesthesia Type:  MAC ASA Status:  3          Anesthesia Type: MAC    Saniya Phase I: Saniya Score: 10    Saniya Phase II:      Last vitals: Reviewed and per EMR flowsheets.        Anesthesia Post Evaluation    Patient location during evaluation: PACU  Patient participation: complete - patient participated  Level of consciousness: awake  Pain score: 0  Airway patency: patent  Nausea & Vomiting: no nausea  Complications: no  Cardiovascular status: blood pressure returned to baseline  Respiratory status: acceptable  Hydration status: euvolemic

## 2020-06-16 ENCOUNTER — PROCEDURE VISIT (OUTPATIENT)
Dept: AUDIOLOGY | Age: 70
End: 2020-06-16
Payer: MEDICARE

## 2020-06-16 ENCOUNTER — OFFICE VISIT (OUTPATIENT)
Dept: ENT CLINIC | Age: 70
End: 2020-06-16
Payer: MEDICARE

## 2020-06-16 VITALS
DIASTOLIC BLOOD PRESSURE: 56 MMHG | WEIGHT: 139 LBS | SYSTOLIC BLOOD PRESSURE: 122 MMHG | HEIGHT: 63 IN | BODY MASS INDEX: 24.63 KG/M2 | HEART RATE: 92 BPM

## 2020-06-16 PROCEDURE — G8399 PT W/DXA RESULTS DOCUMENT: HCPCS | Performed by: OTOLARYNGOLOGY

## 2020-06-16 PROCEDURE — 92557 COMPREHENSIVE HEARING TEST: CPT | Performed by: AUDIOLOGIST

## 2020-06-16 PROCEDURE — G8420 CALC BMI NORM PARAMETERS: HCPCS | Performed by: OTOLARYNGOLOGY

## 2020-06-16 PROCEDURE — 4040F PNEUMOC VAC/ADMIN/RCVD: CPT | Performed by: OTOLARYNGOLOGY

## 2020-06-16 PROCEDURE — 92567 TYMPANOMETRY: CPT | Performed by: AUDIOLOGIST

## 2020-06-16 PROCEDURE — 99213 OFFICE O/P EST LOW 20 MIN: CPT | Performed by: OTOLARYNGOLOGY

## 2020-06-16 PROCEDURE — G8427 DOCREV CUR MEDS BY ELIG CLIN: HCPCS | Performed by: OTOLARYNGOLOGY

## 2020-06-16 PROCEDURE — 1123F ACP DISCUSS/DSCN MKR DOCD: CPT | Performed by: OTOLARYNGOLOGY

## 2020-06-16 PROCEDURE — 1036F TOBACCO NON-USER: CPT | Performed by: OTOLARYNGOLOGY

## 2020-06-16 PROCEDURE — 1090F PRES/ABSN URINE INCON ASSESS: CPT | Performed by: OTOLARYNGOLOGY

## 2020-06-16 PROCEDURE — 3017F COLORECTAL CA SCREEN DOC REV: CPT | Performed by: OTOLARYNGOLOGY

## 2020-06-16 NOTE — PROGRESS NOTES
[facility]  Dr. Ruthy Blizzard. Han Nelson Ms.Ed        Patient Name:  Shoaib Watts  :  1950     CHIEF C/O:    Chief Complaint   Patient presents with    Follow-up     pt here for follow up on meniere diesease. HISTORY OBTAINED FROM:  patient    HISTORY OF PRESENT ILLNESS:       Joanna Spear is a 78y.o. year old female, here today for follow up of known history of right-sided Ménière's disease. Patient has been relatively stable, she is had some intermittent upset that is not vertigo where she can control herself without any associated new changes in her hearing. No current complaints of ear pain, no current complaints of ear drainage. No other complaints today fever chills hoarseness sore throat difficulty swallowing. Audiogram is conducted and reviewed with the patient.       Past Medical History:   Diagnosis Date    Cancer [facility])     basal cell on nose    Hyperlipidemia     Meniere's disease     Thyroid disease     Ulcerative colitis ([facility] [address]      Past Surgical History:   Procedure Laterality Date    COLONOSCOPY      has had several    COLONOSCOPY  12/17/15    bx done, diverticulosis    COLONOSCOPY  2017    multiple biopsy    COLONOSCOPY N/A 2020    COLONOSCOPY WITH BIOPSY performed by Arin Feliz MD at [address] [facility]    umbilical    SKIN CANCER EXCISION      nose   [facility]       Current Outpatient Medications:     Coenzyme Q10 (COQ10) 100 MG CAPS, Take by mouth, Disp: , Rfl:     Red Yeast Rice Extract (RED YEAST RICE PO), Take 2 tablets by mouth daily, Disp: , Rfl:     Multiple Vitamins-Minerals (MULTIVITAMIN ADULTS 50+) TABS, Take 1 tablet by mouth daily, Disp: , Rfl:     thyroid (ARMOUR) 60 MG tablet, Take 60 mg by mouth daily, Disp: , Rfl:     mesalamine (LIALDA) 1.2 G EC tablet, Take 1,200 mg by mouth daily (with breakfast), Disp: , Rfl:     Probiotic Product (PROBIOTIC DAILY PO), Take by mouth, Disp: , Rfl:     folic acid (FOLVITE) 1 MG tablet, Take 1 mg by mouth daily. , Disp: , Rfl:     vitamin E 400 UNIT capsule, Take 400 Units by mouth daily. , Disp: , Rfl:     Calcium Carbonate-Vitamin D (CALCIUM-VITAMIN D) 500-200 MG-UNIT per tablet, Take 1 tablet by mouth daily , Disp: , Rfl:     Omega-3 Fatty Acids (OMEGA 3 PO), Take  by mouth., Disp: , Rfl:   Phenergan [promethazine hcl]; Risedronate; Sulfa antibiotics; Tetanus toxoids; and Demerol hcl [meperidine]  Social History     Tobacco Use    Smoking status: Former Smoker     Last attempt to quit: 2000     Years since quittin.4    Smokeless tobacco: Never Used   Substance Use Topics    Alcohol use: Yes     Comment: .5 beer weekly. 2 cups coffee per day    Drug use: No     Family History   Problem Relation Age of Onset    Cancer Mother        Review of Systems   Constitutional: Negative for chills and fever. HENT: Negative for ear discharge, hearing loss, postnasal drip, rhinorrhea, sinus pressure, sinus pain and sneezing. Respiratory: Negative for cough and shortness of breath. Cardiovascular: Negative for chest pain and palpitations. Gastrointestinal: Negative for vomiting. Skin: Negative for rash. Allergic/Immunologic: Negative for environmental allergies. Neurological: Negative for dizziness and headaches. Hematological: Does not bruise/bleed easily. All other systems reviewed and are negative. BP (!) 122/56   Pulse 92   Ht 5' 3\" (1.6 m)   Wt 139 lb (63 kg)   BMI 24.62 kg/m²   Physical Exam  Vitals signs and nursing note reviewed. Constitutional:       Appearance: She is well-developed. HENT:      Head: Normocephalic. Right Ear: Tympanic membrane, ear canal and external ear normal. Decreased hearing noted. Left Ear: Hearing, tympanic membrane, ear canal and external ear normal.      Nose: No nasal deformity or signs of injury. Left Nostril: No foreign body.       Right Turbinates: Not

## 2020-07-02 ASSESSMENT — ENCOUNTER SYMPTOMS
RHINORRHEA: 0
COUGH: 0
SINUS PAIN: 0
VOMITING: 0
SINUS PRESSURE: 0
SHORTNESS OF BREATH: 0

## 2020-10-15 ENCOUNTER — HOSPITAL ENCOUNTER (OUTPATIENT)
Dept: GENERAL RADIOLOGY | Age: 70
Discharge: HOME | End: 2020-10-17
Payer: MEDICARE

## 2020-10-15 PROCEDURE — 77063 BREAST TOMOSYNTHESIS BI: CPT

## 2021-06-25 ENCOUNTER — PROCEDURE VISIT (OUTPATIENT)
Dept: AUDIOLOGY | Age: 71
End: 2021-06-25
Payer: MEDICARE

## 2021-06-25 ENCOUNTER — OFFICE VISIT (OUTPATIENT)
Dept: ENT CLINIC | Age: 71
End: 2021-06-25
Payer: MEDICARE

## 2021-06-25 VITALS — WEIGHT: 138 LBS | HEIGHT: 63 IN | BODY MASS INDEX: 24.45 KG/M2

## 2021-06-25 DIAGNOSIS — H81.01 MENIERES DISEASE, RIGHT: Primary | ICD-10-CM

## 2021-06-25 DIAGNOSIS — H81.01 MENIERE'S DISEASE OF RIGHT EAR: ICD-10-CM

## 2021-06-25 PROCEDURE — 92567 TYMPANOMETRY: CPT | Performed by: AUDIOLOGIST

## 2021-06-25 PROCEDURE — 99213 OFFICE O/P EST LOW 20 MIN: CPT | Performed by: OTOLARYNGOLOGY

## 2021-06-25 PROCEDURE — G8420 CALC BMI NORM PARAMETERS: HCPCS | Performed by: OTOLARYNGOLOGY

## 2021-06-25 PROCEDURE — G8427 DOCREV CUR MEDS BY ELIG CLIN: HCPCS | Performed by: OTOLARYNGOLOGY

## 2021-06-25 PROCEDURE — 1123F ACP DISCUSS/DSCN MKR DOCD: CPT | Performed by: OTOLARYNGOLOGY

## 2021-06-25 PROCEDURE — 4040F PNEUMOC VAC/ADMIN/RCVD: CPT | Performed by: OTOLARYNGOLOGY

## 2021-06-25 PROCEDURE — G8399 PT W/DXA RESULTS DOCUMENT: HCPCS | Performed by: OTOLARYNGOLOGY

## 2021-06-25 PROCEDURE — 1036F TOBACCO NON-USER: CPT | Performed by: OTOLARYNGOLOGY

## 2021-06-25 PROCEDURE — 3017F COLORECTAL CA SCREEN DOC REV: CPT | Performed by: OTOLARYNGOLOGY

## 2021-06-25 PROCEDURE — 92557 COMPREHENSIVE HEARING TEST: CPT | Performed by: AUDIOLOGIST

## 2021-06-25 PROCEDURE — 1090F PRES/ABSN URINE INCON ASSESS: CPT | Performed by: OTOLARYNGOLOGY

## 2021-06-25 RX ORDER — ESTRADIOL 0.1 MG/G
2 CREAM VAGINAL DAILY
COMMUNITY

## 2021-06-25 NOTE — PROGRESS NOTES
[facility] Otolaryngology  Dr. Po Gonzalez. Nba Rodriguez. Ms.Ed        Patient Name:  Favio Avila  :  1950     CHIEF C/O:    Chief Complaint   Patient presents with    Follow-up     f/u menieres; no better no worse;        HISTORY OBTAINED FROM:  patient    HISTORY OF PRESENT ILLNESS:       Myles Ulloa is a 73y.o. year old female, here today for follow up of right-sided Ménière's disease here for routine follow-up. Patient has had no significant episodes of decreased hearing, or room spinning vertigo. Repeat audiogram was conducted with patient today. No other complaints of increasing tinnitus or aural fullness. No other complaints today of epistaxis nasal congestion fever chills sore throat hoarseness neck mass tumor lesions.       Past Medical History:   Diagnosis Date    Cancer [facility])     basal cell on nose    Hyperlipidemia     Meniere's disease     Thyroid disease     Ulcerative colitis ([facility] [address]      Past Surgical History:   Procedure Laterality Date    COLONOSCOPY      has had several    COLONOSCOPY  12/17/15    bx done, diverticulosis    COLONOSCOPY  2017    multiple biopsy    COLONOSCOPY N/A 2020    COLONOSCOPY WITH BIOPSY performed by Christian Solorzano MD at [address] [facility]    umbilical    SKIN CANCER EXCISION      nose   [facility]       Current Outpatient Medications:     estradiol (ESTRACE) 0.1 MG/GM vaginal cream, Place 2 g vaginally daily, Disp: , Rfl:     Coenzyme Q10 (COQ10) 100 MG CAPS, Take by mouth, Disp: , Rfl:     Red Yeast Rice Extract (RED YEAST RICE PO), Take 2 tablets by mouth daily, Disp: , Rfl:     Multiple Vitamins-Minerals (MULTIVITAMIN ADULTS 50+) TABS, Take 1 tablet by mouth daily, Disp: , Rfl:     thyroid (ARMOUR) 60 MG tablet, Take 60 mg by mouth daily, Disp: , Rfl:     mesalamine (LIALDA) 1.2 G EC tablet, Take 1,200 mg by mouth daily (with breakfast), Disp: , Rfl:     Probiotic Product (PROBIOTIC DAILY PO), Take by mouth, Disp: , Rfl:     folic acid (FOLVITE) 1 MG tablet, Take 1 mg by mouth daily. , Disp: , Rfl:     Calcium Carbonate-Vitamin D (CALCIUM-VITAMIN D) 500-200 MG-UNIT per tablet, Take 1 tablet by mouth daily , Disp: , Rfl:     Omega-3 Fatty Acids (OMEGA 3 PO), Take  by mouth., Disp: , Rfl:   Phenergan [promethazine hcl], Risedronate, Sulfa antibiotics, Tetanus toxoids, and Demerol hcl [meperidine]  Social History     Tobacco Use    Smoking status: Former Smoker     Quit date: 2000     Years since quittin.5    Smokeless tobacco: Never Used   Vaping Use    Vaping Use: Never used   Substance Use Topics    Alcohol use: Yes     Comment: .5 beer weekly. 2 cups coffee per day    Drug use: No     Family History   Problem Relation Age of Onset    Cancer Mother        Review of Systems   Constitutional: Negative for chills and fever. HENT: Negative for congestion, ear discharge, hearing loss, rhinorrhea, sinus pressure, tinnitus and voice change. Respiratory: Negative for cough and shortness of breath. Cardiovascular: Negative for chest pain and palpitations. Gastrointestinal: Negative for vomiting. Skin: Negative for rash. Allergic/Immunologic: Negative for environmental allergies. Neurological: Negative for dizziness and headaches. Hematological: Does not bruise/bleed easily. All other systems reviewed and are negative. Ht 5' 3\" (1.6 m)   Wt 138 lb (62.6 kg)   BMI 24.45 kg/m²   Physical Exam  Vitals and nursing note reviewed. Constitutional:       Appearance: She is well-developed. HENT:      Head: Normocephalic and atraumatic. Right Ear: Decreased hearing noted. Left Ear: Hearing, tympanic membrane, ear canal and external ear normal.      Nose: No septal deviation, congestion or rhinorrhea. Mouth/Throat:      Mouth: No injury or oral lesions. Dentition: Normal dentition. No dental caries. Pharynx: Oropharynx is clear.  No oropharyngeal exudate. Eyes:      Pupils: Pupils are equal, round, and reactive to light. Neck:      Thyroid: No thyromegaly. Trachea: No tracheal deviation. Cardiovascular:      Rate and Rhythm: Normal rate. Pulmonary:      Effort: Pulmonary effort is normal. No respiratory distress. Musculoskeletal:         General: No tenderness. Normal range of motion. Cervical back: Normal range of motion and neck supple. Skin:     General: Skin is warm and dry. Neurological:      Mental Status: She is alert. Cranial Nerves: No cranial nerve deficit. IMPRESSION/PLAN:  With a history of known right-sided Ménière's disease, slightly decreased findings of the right ear from a hearing loss standpoint we'll repeat in 6 months however patient has been asymptomatic in terms of associated room spinning vertigo, increasing tinnitus, or ear fullness. Continue conservative measures in the form of a low-salt diet as well as repeat audiogram in 6 months. Dr. Tyrone Johnson.  Otolaryngology Facial Plastic Surgery  :  [facility] Otolaryngology/Facial Plastic Surgery Residency  Associate Clinical Professor:  EVER Jaramillo  [facility]

## 2021-06-25 NOTE — PROGRESS NOTES
This patient was referred for audiometric/tympanometric testing by Dr. Sydnie Bryant. Patient is being seen for her yearly ENT/Audiology consult, for Meniere's disease, right ear. Audiometry revealed a mild high-frequency hearing loss, at 6000-8000Hz, left ear and a moderate-to-moderately-severe sensorineural hearing loss, through the frequency range, right ear. Reliability was good. Speech reception thresholds were in good agreement with the pure tone averages, bilaterally. Speech discrimination scores were 100%, left ear at 50dBHL and 88%, right era at [address]. Tympanometry revealed normal middle ear peak pressure and compliance, bilaterally. Ipsilateral acoustic reflexes were absent, right ear and present, left ear at 1000Hz. The results were reviewed with the patient. Recommendations for follow up will be made pending physician consult.     Jamin Pratt, LOBITO/A  Audiologist  [id number]  NPI#:  [id number]

## 2021-07-28 ASSESSMENT — ENCOUNTER SYMPTOMS
SHORTNESS OF BREATH: 0
VOICE CHANGE: 0
RHINORRHEA: 0
COUGH: 0
VOMITING: 0
SINUS PRESSURE: 0

## 2021-09-20 ENCOUNTER — HOSPITAL ENCOUNTER (OUTPATIENT)
Dept: GENERAL RADIOLOGY | Age: 71
Discharge: HOME | End: 2021-09-22
Payer: MEDICARE

## 2021-09-20 DIAGNOSIS — N63.0 BREAST LUMP: ICD-10-CM

## 2021-09-20 PROCEDURE — G0279 TOMOSYNTHESIS, MAMMO: HCPCS

## 2021-09-20 PROCEDURE — 76642 ULTRASOUND BREAST LIMITED: CPT

## 2022-04-04 ENCOUNTER — TELEPHONE (OUTPATIENT)
Dept: PRIMARY CARE CLINIC | Age: 72
End: 2022-04-04

## 2022-04-04 NOTE — TELEPHONE ENCOUNTER
Phone call to patient to schedule annual well visit.   Patient and  agreeable to see Dr. Anne Marie Johnson.

## 2022-04-04 NOTE — TELEPHONE ENCOUNTER
----- Message from Jeimy Rosen sent at 4/4/2022 12:19 PM EDT -----  Subject: Appointment Request    Reason for Call: Routine Medicare AWV    QUESTIONS  Type of Appointment? Established Patient  Reason for appointment request? Available appointments did not meet   patient need  Additional Information for Provider? Pt is calling in for her AWV. She   wants to schedule with DR. Sujit Tran for her and her . No appts showing,   please call pt at your convenience. Pt will be back from [facility] May 15th  ---------------------------------------------------------------------------  --------------  CALL BACK INFO  What is the best way for the office to contact you? OK to leave message on   voicemail  Preferred Call Back Phone Number? [phone]  ---------------------------------------------------------------------------  --------------  SCRIPT ANSWERS  Relationship to Patient? Self  (If the patient has Medicare as their primary insurance coverage ask this   question) Are you requesting a Medicare Annual Wellness Visit? Yes   (Is the patient requesting a pap smear with their physical exam?)? No  (Is the patient requesting their annual physical and does not need PAP or   AWV per above?)? No  Have you been diagnosed with, awaiting test results for, or told that you   are suspected of having COVID-19 (Coronavirus)? (If patient has tested   negative or was tested as a requirement for work, school, or travel and   not based on symptoms, answer no)? No  Within the past 10 days have you developed any of the following symptoms   (answer no if symptoms have been present longer than 10 days or began   more than 10 days ago)? Fever or Chills, Cough, Shortness of breath or   difficulty breathing, Loss of taste or smell, Sore throat, Nasal   congestion, Sneezing or runny nose, Fatigue or generalized body aches   (answer no if pain is specific to a body part e.g. back pain), Diarrhea,   Headache?  No  Have you had close contact with someone with COVID-19 in the last 7 days? No  (Service Expert  click yes below to proceed with [email or web address] As Usual   Scheduling)?  Yes

## 2022-05-17 ENCOUNTER — TELEPHONE (OUTPATIENT)
Dept: PRIMARY CARE CLINIC | Age: 72
End: 2022-05-17

## 2022-05-17 DIAGNOSIS — E78.5 HYPERLIPIDEMIA, UNSPECIFIED HYPERLIPIDEMIA TYPE: ICD-10-CM

## 2022-05-17 DIAGNOSIS — Z00.00 MEDICARE ANNUAL WELLNESS VISIT, INITIAL: Primary | ICD-10-CM

## 2022-05-17 DIAGNOSIS — E03.9 ACQUIRED HYPOTHYROIDISM: ICD-10-CM

## 2022-05-17 NOTE — TELEPHONE ENCOUNTER
Patient want labs prior to next appointment on 6/27/2022. Last appointment  6/15/2021    Chart pulled.

## 2022-05-20 DIAGNOSIS — E03.9 ACQUIRED HYPOTHYROIDISM: ICD-10-CM

## 2022-05-20 DIAGNOSIS — Z00.00 MEDICARE ANNUAL WELLNESS VISIT, INITIAL: ICD-10-CM

## 2022-05-20 DIAGNOSIS — E78.5 HYPERLIPIDEMIA, UNSPECIFIED HYPERLIPIDEMIA TYPE: ICD-10-CM

## 2022-05-20 LAB
ALBUMIN SERPL-MCNC: 4.4 G/DL (ref 3.5–5.2)
ALP BLD-CCNC: 51 U/L (ref 35–104)
ALT SERPL-CCNC: 15 U/L (ref 0–32)
ANION GAP SERPL CALCULATED.3IONS-SCNC: 12 MMOL/L (ref 7–16)
AST SERPL-CCNC: 22 U/L (ref 0–31)
BILIRUB SERPL-MCNC: 0.8 MG/DL (ref 0–1.2)
BUN BLDV-MCNC: 12 MG/DL (ref 6–23)
CALCIUM SERPL-MCNC: 9.6 MG/DL (ref 8.6–10.2)
CHLORIDE BLD-SCNC: 106 MMOL/L (ref 98–107)
CHOLESTEROL, TOTAL: 227 MG/DL (ref 0–199)
CO2: 23 MMOL/L (ref 22–29)
CREAT SERPL-MCNC: 1 MG/DL (ref 0.5–1)
GFR AFRICAN AMERICAN: >60
GFR NON-AFRICAN AMERICAN: 55 ML/MIN/1.73
GLUCOSE FASTING: 95 MG/DL (ref 74–99)
HCT VFR BLD CALC: 36.5 % (ref 34–48)
HDLC SERPL-MCNC: 67 MG/DL
HEMOGLOBIN: 11.7 G/DL (ref 11.5–15.5)
LDL CHOLESTEROL CALCULATED: 140 MG/DL (ref 0–99)
MCH RBC QN AUTO: 31.6 PG (ref 26–35)
MCHC RBC AUTO-ENTMCNC: 32.1 % (ref 32–34.5)
MCV RBC AUTO: 98.6 FL (ref 80–99.9)
PDW BLD-RTO: 13 FL (ref 11.5–15)
PLATELET # BLD: 189 E9/L (ref 130–450)
PMV BLD AUTO: 10.8 FL (ref 7–12)
POTASSIUM SERPL-SCNC: 4.4 MMOL/L (ref 3.5–5)
RBC # BLD: 3.7 E12/L (ref 3.5–5.5)
SODIUM BLD-SCNC: 141 MMOL/L (ref 132–146)
TOTAL PROTEIN: 7 G/DL (ref 6.4–8.3)
TRIGL SERPL-MCNC: 100 MG/DL (ref 0–149)
TSH SERPL DL<=0.05 MIU/L-ACNC: 1.65 UIU/ML (ref 0.27–4.2)
VLDLC SERPL CALC-MCNC: 20 MG/DL
WBC # BLD: 4.2 E9/L (ref 4.5–11.5)

## 2022-06-17 SDOH — HEALTH STABILITY: PHYSICAL HEALTH: ON AVERAGE, HOW MANY MINUTES DO YOU ENGAGE IN EXERCISE AT THIS LEVEL?: 30 MIN

## 2022-06-17 SDOH — HEALTH STABILITY: PHYSICAL HEALTH: ON AVERAGE, HOW MANY DAYS PER WEEK DO YOU ENGAGE IN MODERATE TO STRENUOUS EXERCISE (LIKE A BRISK WALK)?: 5 DAYS

## 2022-06-17 ASSESSMENT — LIFESTYLE VARIABLES
HOW OFTEN DO YOU HAVE A DRINK CONTAINING ALCOHOL: 2-3 TIMES A WEEK
HOW OFTEN DO YOU HAVE SIX OR MORE DRINKS ON ONE OCCASION: 1
HOW MANY STANDARD DRINKS CONTAINING ALCOHOL DO YOU HAVE ON A TYPICAL DAY: 1 OR 2
HOW MANY STANDARD DRINKS CONTAINING ALCOHOL DO YOU HAVE ON A TYPICAL DAY: 1
HOW OFTEN DO YOU HAVE A DRINK CONTAINING ALCOHOL: 4

## 2022-06-17 ASSESSMENT — PATIENT HEALTH QUESTIONNAIRE - PHQ9
SUM OF ALL RESPONSES TO PHQ QUESTIONS 1-9: 0
SUM OF ALL RESPONSES TO PHQ QUESTIONS 1-9: 0
1. LITTLE INTEREST OR PLEASURE IN DOING THINGS: 0
SUM OF ALL RESPONSES TO PHQ QUESTIONS 1-9: 0
SUM OF ALL RESPONSES TO PHQ QUESTIONS 1-9: 0
SUM OF ALL RESPONSES TO PHQ9 QUESTIONS 1 & 2: 0
2. FEELING DOWN, DEPRESSED OR HOPELESS: 0

## 2022-06-22 NOTE — PROGRESS NOTES
[address] [facility]                                                                                                                    PRE OP INSTRUCTIONS FOR  Melony Aguilar        Date: 6/22/2022    Date of surgery: 6/23/22   Arrival Time: Hospital will call you between 5pm and 7pm with your final arrival time for surgery    1. Nothing by mouth (NPO) as instructed.____________________________________________________________________    2. Take the following medications with a small sip of water on the morning of Surgery: thyroid, zyrtec     3. Diabetics may take evening dose of insulin but none after midnight. If you feel symptomatic or low blood sugar morning of surgery drink 1-2 ounces of apple juice only. 4. Aspirin, Ibuprofen, Advil, Naproxen, Vitamin E and other Anti-inflammatory products should be stopped  before surgery  as directed by your physician. Take Tylenol only unless instructed otherwise by your surgeon. 5. Check with your Doctor regarding stopping Plavix, Coumadin, Lovenox, Eliquis, Effient, or other blood thinners. 6. Do not smoke,use illicit drugs and do not drink any alcoholic beverages 24 hours prior to surgery. 7. You may brush your teeth the morning of surgery. DO NOT SWALLOW WATER    8. You MUST make arrangements for a responsible adult to take you home after your surgery. You will not be allowed to leave alone or drive yourself home. It is strongly suggested someone stay with you the first 24 hrs. Your surgery will be cancelled if you do not have a ride home. 9. PEDIATRIC PATIENTS ONLY:  A parent/legal guardian must accompany a child scheduled for surgery and plan to stay at the hospital until the child is discharged. Please do not bring other children with you.     10. Please wear simple, loose fitting clothing to the hospital.  Charles Bars not bring valuables (money, credit cards, checkbooks, etc.) Do not wear any makeup (including no eye makeup) or nail polish on your fingers or toes. 11. DO NOT wear any jewelry or piercings on day of surgery. All body piercing jewelry must be removed. 12. Shower the night before surgery with __x_Antibacterial soap /ANNA WIPES________    13. TOTAL JOINT REPLACEMENT/HYSTERECTOMY PATIENTS ONLY---Remember to bring Blood Bank bracelet to the hospital on the day of surgery. 14. If you have a Living Will and Durable Power of  for Healthcare, please bring in a copy. 15. If appropriate bring crutches, inspirex, WALKER, CANE etc... 12. Notify your Surgeon if you develop any illness between now and surgery time, cough, cold, fever, sore throat, nausea, vomiting, etc.  Please notify your surgeon if you experience dizziness, shortness of breath or blurred vision between now & the time of your surgery. 17. If you have ___dentures, they will be removed before going to the OR; we will provide you a container. If you wear ___contact lenses or ___glasses, they will be removed; please bring a case for them. 18. To provide excellent care visitors will be limited to 2 in the room at any given time. 19. Please bring picture ID and insurance card. 20. During flu season no children under the age of 15 are permitted in the hospital for the safety of all patients. 21. Other please check in at the information desk/main lobby. Please wear a mask. Please call AMBULATORY CARE if you have any further questions.    [address] [facility]     [address]

## 2022-06-23 ENCOUNTER — HOSPITAL ENCOUNTER (OUTPATIENT)
Age: 72
Setting detail: OUTPATIENT SURGERY
Discharge: HOME | End: 2022-06-23
Attending: INTERNAL MEDICINE | Admitting: INTERNAL MEDICINE
Payer: MEDICARE

## 2022-06-23 ENCOUNTER — ANESTHESIA (OUTPATIENT)
Dept: ENDOSCOPY | Age: 72
End: 2022-06-23
Payer: MEDICARE

## 2022-06-23 ENCOUNTER — ANESTHESIA EVENT (OUTPATIENT)
Dept: ENDOSCOPY | Age: 72
End: 2022-06-23
Payer: MEDICARE

## 2022-06-23 VITALS
TEMPERATURE: 97.8 F | BODY MASS INDEX: 23.92 KG/M2 | RESPIRATION RATE: 16 BRPM | WEIGHT: 135 LBS | HEIGHT: 63 IN | DIASTOLIC BLOOD PRESSURE: 56 MMHG | HEART RATE: 74 BPM | SYSTOLIC BLOOD PRESSURE: 121 MMHG | OXYGEN SATURATION: 97 %

## 2022-06-23 DIAGNOSIS — Z12.11 ENCOUNTER FOR COLORECTAL CANCER SCREENING: ICD-10-CM

## 2022-06-23 DIAGNOSIS — Z12.12 ENCOUNTER FOR COLORECTAL CANCER SCREENING: ICD-10-CM

## 2022-06-23 PROCEDURE — 88305 TISSUE EXAM BY PATHOLOGIST: CPT

## 2022-06-23 PROCEDURE — 6360000002 HC RX W HCPCS: Performed by: NURSE ANESTHETIST, CERTIFIED REGISTERED

## 2022-06-23 PROCEDURE — 7100000010 HC PHASE II RECOVERY - FIRST 15 MIN: Performed by: INTERNAL MEDICINE

## 2022-06-23 PROCEDURE — 3700000000 HC ANESTHESIA ATTENDED CARE: Performed by: INTERNAL MEDICINE

## 2022-06-23 PROCEDURE — 3609010300 HC COLONOSCOPY W/BIOPSY SINGLE/MULTIPLE: Performed by: INTERNAL MEDICINE

## 2022-06-23 PROCEDURE — 7100000011 HC PHASE II RECOVERY - ADDTL 15 MIN: Performed by: INTERNAL MEDICINE

## 2022-06-23 PROCEDURE — 2580000003 HC RX 258: Performed by: ANESTHESIOLOGY

## 2022-06-23 PROCEDURE — 3700000001 HC ADD 15 MINUTES (ANESTHESIA): Performed by: INTERNAL MEDICINE

## 2022-06-23 PROCEDURE — 2709999900 HC NON-CHARGEABLE SUPPLY: Performed by: INTERNAL MEDICINE

## 2022-06-23 RX ORDER — PROPOFOL 10 MG/ML
INJECTION, EMULSION INTRAVENOUS CONTINUOUS PRN
Status: DISCONTINUED | OUTPATIENT
Start: 2022-06-23 | End: 2022-06-23 | Stop reason: SDUPTHER

## 2022-06-23 RX ORDER — SODIUM CHLORIDE, SODIUM LACTATE, POTASSIUM CHLORIDE, CALCIUM CHLORIDE 600; 310; 30; 20 MG/100ML; MG/100ML; MG/100ML; MG/100ML
INJECTION, SOLUTION INTRAVENOUS CONTINUOUS
Status: DISCONTINUED | OUTPATIENT
Start: 2022-06-23 | End: 2022-06-23 | Stop reason: HOSPADM

## 2022-06-23 RX ADMIN — SODIUM CHLORIDE, POTASSIUM CHLORIDE, SODIUM LACTATE AND CALCIUM CHLORIDE: 600; 310; 30; 20 INJECTION, SOLUTION INTRAVENOUS at 07:39

## 2022-06-23 RX ADMIN — PROPOFOL 75 MCG/KG/MIN: 10 INJECTION, EMULSION INTRAVENOUS at 08:25

## 2022-06-23 ASSESSMENT — PAIN - FUNCTIONAL ASSESSMENT: PAIN_FUNCTIONAL_ASSESSMENT: NONE - DENIES PAIN

## 2022-06-23 NOTE — ANESTHESIA PRE PROCEDURE
Department of Anesthesiology  Preprocedure Note       Name:  Karo Marcus   Age:  81 y.o.  :  1950                                          MRN:  [id number]         Date:  2022      Surgeon: Clementina Miles):  Barb Bledsoe MD    Procedure: Procedure(s):  COLONOSCOPY    Medications prior to admission:   Prior to Admission medications    Medication Sig Start Date End Date Taking? Authorizing Provider   Cetirizine HCl (ZYRTEC PO) Take by mouth daily as needed    Historical Provider, MD   estradiol (ESTRACE) 0.1 MG/GM vaginal cream Place 2 g vaginally daily    Historical Provider, MD   Coenzyme Q10 (COQ10) 100 MG CAPS Take by mouth    Historical Provider, MD   Red Yeast Rice Extract (RED YEAST RICE PO) Take 2 tablets by mouth daily    Historical Provider, MD   Multiple Vitamins-Minerals (MULTIVITAMIN ADULTS 50+) TABS Take 1 tablet by mouth daily    Historical Provider, MD   thyroid (ARMOUR) 60 MG tablet Take 60 mg by mouth daily    Historical Provider, MD   mesalamine (LIALDA) 1.2 G EC tablet Take 1,200 mg by mouth daily (with breakfast)    Historical Provider, MD   Probiotic Product (PROBIOTIC DAILY PO) Take by mouth    Historical Provider, MD   folic acid (FOLVITE) 1 MG tablet Take 1 mg by mouth daily. Historical Provider, MD   Calcium Carbonate-Vitamin D (CALCIUM-VITAMIN D) 500-200 MG-UNIT per tablet Take 1 tablet by mouth daily     Historical Provider, MD   Omega-3 Fatty Acids (OMEGA 3 PO) Take  by mouth. Historical Provider, MD       Current medications:    No current facility-administered medications for this visit. No current outpatient medications on file. Facility-Administered Medications Ordered in Other Visits   Medication Dose Route Frequency Provider Last Rate Last Admin    lactated ringers infusion   IntraVENous Continuous Philly Hill MD 75 mL/hr at 22 0739 New Bag at 22 0739       Allergies:     Allergies   Allergen Reactions    Phenergan [Promethazine Hcl] Other (See Comments)     Developed a phlebitis after IV administration    Risedronate      joint pain    Sulfa Antibiotics Hives    Tetanus Toxoids Hives    Demerol Hcl [Meperidine] Nausea And Vomiting     After pt had IV demerol became very nauseated with vomiting       Problem List:    Patient Active Problem List   Diagnosis Code    Atypical chest pain R07.89    Subjective tinnitus H93.19       Past Medical History:        Diagnosis Date    Hyperlipidemia     Meniere's disease     Scoliosis     Thoracic outlet syndrome     Thyroid disease     Ulcerative colitis ([facility] [address]        Past Surgical History:        Procedure Laterality Date    COLONOSCOPY      has had several    COLONOSCOPY  12/17/15    bx done, diverticulosis    COLONOSCOPY  2017    multiple biopsy    COLONOSCOPY N/A 2020    COLONOSCOPY WITH BIOPSY performed by Denisse Monteiro MD at [address] [facility]    umbilical    SKIN CANCER EXCISION      nose    [address] [facility] [address]       Social History:    Social History     Tobacco Use    Smoking status: Former Smoker     Quit date: 2000     Years since quittin.4    Smokeless tobacco: Never Used   Substance Use Topics    Alcohol use: Yes     Comment: .5 beer weekly. 2 cups coffee per day                                Counseling given: Not Answered      Vital Signs (Current): There were no vitals filed for this visit.                                            BP Readings from Last 3 Encounters:   22 (!) 110/56   20 (!) 122/56   20 (!) 111/53       NPO Status:                                                                                 BMI:   Wt Readings from Last 3 Encounters:   22 135 lb (61.2 kg)   21 138 lb (62.6 kg)   20 139 lb (63 kg)     There is no height or weight on file to calculate BMI.    CBC:   Lab Results   Component Value Date    WBC 4.2 2022    RBC 3.70 2022 HGB 11.7 05/20/2022    HCT 36.5 05/20/2022    MCV 98.6 05/20/2022    RDW 13.0 05/20/2022     05/20/2022       CMP:   Lab Results   Component Value Date     05/20/2022    K 4.4 05/20/2022     05/20/2022    CO2 23 05/20/2022    BUN 12 05/20/2022    CREATININE 1.0 05/20/2022    GFRAA >60 05/20/2022    LABGLOM 55 05/20/2022    GLUCOSE 99 06/21/2021    PROT 7.0 05/20/2022    CALCIUM 9.6 05/20/2022    BILITOT 0.8 05/20/2022    ALKPHOS 51 05/20/2022    AST 22 05/20/2022    ALT 15 05/20/2022       POC Tests: No results for input(s): POCGLU, POCNA, POCK, POCCL, POCBUN, POCHEMO, POCHCT in the last 72 hours. Coags: No results found for: PROTIME, INR, APTT    HCG (If Applicable): No results found for: PREGTESTUR, PREGSERUM, HCG, HCGQUANT     ABGs: No results found for: PHART, PO2ART, XMG4TDZ, CRC2OLG, BEART, L1UXNANG     Type & Screen (If Applicable):  No results found for: LABABO, LABRH    Drug/Infectious Status (If Applicable):  No results found for: HIV, HEPCAB    COVID-19 Screening (If Applicable):   Lab Results   Component Value Date    COVID19 Not Detected 06/08/2020         Anesthesia Evaluation  Patient summary reviewed  Airway: Mallampati: Unable to assess / NA          Dental:          Pulmonary: breath sounds clear to auscultation                            ROS comment: Meniere's Disease. Former Smoker. Cardiovascular:    (+) hyperlipidemia      ECG reviewed  Rhythm: regular                   ROS comment: EKG: Normal Sinus Rhythm 76. ECHO:  Patient completed 2 minutes on an accelerated Twan protocol achieving 4.6  Mets and 100% MPHR   Noncardiac chest pain. ECG portion of stress test is negative for ischemia by diagnostic criteria. Based on a Duke treadmill score of 3 an intermediate risk of ischemic events is suggested.    A baseline echocardiogram demonstrated a normal sized left ventricular chamber with no regional abnormalities and a normal echocardiographic   response to exercise     Neuro/Psych:   Negative Neuro/Psych ROS              GI/Hepatic/Renal:   (+) PUD,          ROS comment: Ulcerative Colitis. .   Endo/Other:    (+) hypothyroidism::., .                  ROS comment: Basal Cell Carcinoma nose. Abdominal:             Vascular: negative vascular ROS. Other Findings:             Anesthesia Plan      MAC     ASA 3       Induction: intravenous. Anesthetic plan and risks discussed with patient. Plan discussed with CRNA.                     Renée Miranda MD   6/23/2022

## 2022-06-23 NOTE — PROCEDURES
[address]                               COLONOSCOPY REPORT    PATIENT NAME: Alma Noland                   :        1950  MED REC NO:   [id number]                            ROOM:  ACCOUNT NO:   [de-identified]                           ADMIT DATE: 2022  PROVIDER:     Arlene Dougherty MD    DATE OF PROCEDURE:  2022    SURGEON:  Arlene Dougherty MD    PREOPERATIVE DIAGNOSIS:  Ulcerative colitis. POSTOPERATIVE DIAGNOSES:  Quiescent disease, diverticulosis coli,  hemorrhoids. OPERATION:  Colonoscopy. INDICATIONS:  The patient is a 68-year-old. History of ulcerative  colitis, diagnosed in the mid [de-identified]. She has quiescent disease. ASA  classification II. Informed consent. DIGITAL RECTAL EXAM:  Anatomic. OPERATIVE PROCEDURE:  The Olympus video colonoscope was introduced  through the anus, advanced gently until the cecum. The prep was good. The mucosa of the left side is somewhat smooth, otherwise normal.  Few  diverticular openings in the sigmoid. Hemorrhoids observed. Biopsies  as requested. Endoscope withdrawn. Estimated blood loss none.         Jony Matthew MD    D: 2022 8:45:47       T: 2022 8:48:08     [email or web address]  Job#: [id number]     Doc#: [id number]    CC:

## 2022-06-23 NOTE — ANESTHESIA POSTPROCEDURE EVALUATION
Department of Anesthesiology  Postprocedure Note    Patient: Tejinder Crook  MRN: [id number]  YOB: 1950  Date of evaluation: 6/23/2022      Procedure Summary     Date: 06/23/22 Room / Location: [address] [place] [address] 01 / 4199 [facility], operated by [facility]    Anesthesia Start: 7141 Anesthesia Stop: 6017    Procedure: COLONOSCOPY WITH BIOPSY (N/A ) Diagnosis:       Encounter for colorectal cancer screening      (Encounter for colorectal cancer screening [Z12.11, Z12.12])    Surgeons: Rosalba Chase MD Responsible Provider: Leigh Lloyd MD    Anesthesia Type: MAC ASA Status: 2          Anesthesia Type: No value filed.     Saniya Phase I: Saniya Score: 10    Saniya Phase II:      Last vitals:   Vitals Value Taken Time   /51 06/23/22 0903   Temp 36.5 06/23/22 0903   Pulse 83 06/23/22 0903   Resp 17 06/23/22 0903   SpO2 99 06/23/22 0903         Anesthesia Post Evaluation    Patient location during evaluation: PACU  Patient participation: complete - patient participated  Level of consciousness: awake  Pain score: 0  Airway patency: patent  Nausea & Vomiting: no nausea  Complications: no  Cardiovascular status: hemodynamically stable  Respiratory status: acceptable  Hydration status: stable

## 2022-06-23 NOTE — ANESTHESIA PRE PROCEDURE
Department of Anesthesiology  Preprocedure Note       Name:  Chintan Calhoun   Age:  81 y.o.  :  1950                                          MRN:  [id number]         Date:  2022      Surgeon: Lynsey Acosta):  Mary Ellis MD    Procedure: Procedure(s):  COLONOSCOPY    Medications prior to admission:   Prior to Admission medications    Medication Sig Start Date End Date Taking? Authorizing Provider   Cetirizine HCl (ZYRTEC PO) Take by mouth daily as needed   Yes Historical Provider, MD   estradiol (ESTRACE) 0.1 MG/GM vaginal cream Place 2 g vaginally daily    Historical Provider, MD   Coenzyme Q10 (COQ10) 100 MG CAPS Take by mouth    Historical Provider, MD   Red Yeast Rice Extract (RED YEAST RICE PO) Take 2 tablets by mouth daily    Historical Provider, MD   Multiple Vitamins-Minerals (MULTIVITAMIN ADULTS 50+) TABS Take 1 tablet by mouth daily    Historical Provider, MD   thyroid (ARMOUR) 60 MG tablet Take 60 mg by mouth daily    Historical Provider, MD   mesalamine (LIALDA) 1.2 G EC tablet Take 1,200 mg by mouth daily (with breakfast)    Historical Provider, MD   Probiotic Product (PROBIOTIC DAILY PO) Take by mouth    Historical Provider, MD   folic acid (FOLVITE) 1 MG tablet Take 1 mg by mouth daily. Historical Provider, MD   Calcium Carbonate-Vitamin D (CALCIUM-VITAMIN D) 500-200 MG-UNIT per tablet Take 1 tablet by mouth daily     Historical Provider, MD   Omega-3 Fatty Acids (OMEGA 3 PO) Take  by mouth.     Historical Provider, MD       Current medications:    Current Facility-Administered Medications   Medication Dose Route Frequency Provider Last Rate Last Admin    lactated ringers infusion   IntraVENous Continuous Faye Hurst MD 75 mL/hr at 22 0819 [place] at 22 0819     Facility-Administered Medications Ordered in Other Encounters   Medication Dose Route Frequency Provider Last Rate Last Admin    propofol injection   IntraVENous Continuous PRN JARAD Xie - CRNA 27.54 mL/hr at 22 0825 75 mcg/kg/min at 22 0825       Allergies: Allergies   Allergen Reactions    Phenergan [Promethazine Hcl] Other (See Comments)     Developed a phlebitis after IV administration    Risedronate      joint pain    Sulfa Antibiotics Hives    Tetanus Toxoids Hives    Demerol Hcl [Meperidine] Nausea And Vomiting     After pt had IV demerol became very nauseated with vomiting       Problem List:    Patient Active Problem List   Diagnosis Code    Atypical chest pain R07.89    Subjective tinnitus H93.19       Past Medical History:        Diagnosis Date    Hyperlipidemia     Meniere's disease     Scoliosis     Thoracic outlet syndrome     Thyroid disease     Ulcerative colitis ([facility] [address]        Past Surgical History:        Procedure Laterality Date    COLONOSCOPY      has had several    COLONOSCOPY  12/17/15    bx done, diverticulosis    COLONOSCOPY  2017    multiple biopsy    COLONOSCOPY N/A 2020    COLONOSCOPY WITH BIOPSY performed by Emeka Bernal MD at [address] [facility]    umbilical    SKIN CANCER EXCISION      nose    [address]       Social History:    Social History     Tobacco Use    Smoking status: Former Smoker     Quit date: 2000     Years since quittin.4    Smokeless tobacco: Never Used   Substance Use Topics    Alcohol use: Yes     Comment: .5 beer weekly.  2 cups coffee per day                                Counseling given: Not Answered      Vital Signs (Current):   Vitals:    22 0722   BP: (!) 110/56   Pulse: 83   Resp: 16   Temp: 36.7 °C (98 °F)   SpO2: 100%   Weight: 135 lb (61.2 kg)   Height: 5' 3\" (1.6 m)                                              BP Readings from Last 3 Encounters:   22 (!) 110/56   20 (!) 122/56   20 (!) 111/53       NPO Status: Time of last liquid consumption:                         Time of last solid consumption:  Date of last liquid consumption: 06/22/22                        Date of last solid food consumption: 06/21/22    BMI:   Wt Readings from Last 3 Encounters:   06/23/22 135 lb (61.2 kg)   06/25/21 138 lb (62.6 kg)   06/16/20 139 lb (63 kg)     Body mass index is 23.91 kg/m². CBC:   Lab Results   Component Value Date    WBC 4.2 05/20/2022    RBC 3.70 05/20/2022    HGB 11.7 05/20/2022    HCT 36.5 05/20/2022    MCV 98.6 05/20/2022    RDW 13.0 05/20/2022     05/20/2022       CMP:   Lab Results   Component Value Date     05/20/2022    K 4.4 05/20/2022     05/20/2022    CO2 23 05/20/2022    BUN 12 05/20/2022    CREATININE 1.0 05/20/2022    GFRAA >60 05/20/2022    LABGLOM 55 05/20/2022    GLUCOSE 99 06/21/2021    PROT 7.0 05/20/2022    CALCIUM 9.6 05/20/2022    BILITOT 0.8 05/20/2022    ALKPHOS 51 05/20/2022    AST 22 05/20/2022    ALT 15 05/20/2022       POC Tests: No results for input(s): POCGLU, POCNA, POCK, POCCL, POCBUN, POCHEMO, POCHCT in the last 72 hours.     Coags: No results found for: PROTIME, INR, APTT    HCG (If Applicable): No results found for: PREGTESTUR, PREGSERUM, HCG, HCGQUANT     ABGs: No results found for: PHART, PO2ART, LFE1BOV, NBG2KDG, BEART, L4GTHEYN     Type & Screen (If Applicable):  No results found for: LABABO, LABRH    Drug/Infectious Status (If Applicable):  No results found for: HIV, HEPCAB    COVID-19 Screening (If Applicable):   Lab Results   Component Value Date    COVID19 Not Detected 06/08/2020           Anesthesia Evaluation  Patient summary reviewed and Nursing notes reviewed no history of anesthetic complications:   Airway: Mallampati: II  TM distance: >3 FB     Mouth opening: > = 3 FB   Dental: normal exam         Pulmonary:Negative Pulmonary ROS breath sounds clear to auscultation                             Cardiovascular:  Exercise tolerance: good (>4 METS),           Rhythm: regular  Rate: normal                    Neuro/Psych: GI/Hepatic/Renal:   (+) PUD,           Endo/Other:                     Abdominal:             Vascular: Other Findings:           Anesthesia Plan      MAC     ASA 2             Anesthetic plan and risks discussed with patient.                         JARAD Munoz - CRNA   6/23/2022

## 2022-06-23 NOTE — H&P
H&p reviewed. No changes. Blood pressure (!) 110/56, pulse 83, temperature 98 °F (36.7 °C), resp. rate 16, height 5' 3\" (1.6 m), weight 135 lb (61.2 kg), SpO2 100 %, not currently breastfeeding.

## 2022-06-27 ENCOUNTER — OFFICE VISIT (OUTPATIENT)
Dept: ENT CLINIC | Age: 72
End: 2022-06-27
Payer: MEDICARE

## 2022-06-27 ENCOUNTER — PROCEDURE VISIT (OUTPATIENT)
Dept: AUDIOLOGY | Age: 72
End: 2022-06-27
Payer: MEDICARE

## 2022-06-27 VITALS
DIASTOLIC BLOOD PRESSURE: 69 MMHG | HEART RATE: 79 BPM | BODY MASS INDEX: 23.81 KG/M2 | SYSTOLIC BLOOD PRESSURE: 114 MMHG | HEIGHT: 63 IN | WEIGHT: 134.4 LBS

## 2022-06-27 DIAGNOSIS — H81.01 MENIERE'S DISEASE OF RIGHT EAR: Primary | ICD-10-CM

## 2022-06-27 DIAGNOSIS — H81.01 MENIERE'S DISEASE OF RIGHT EAR: ICD-10-CM

## 2022-06-27 PROCEDURE — 92567 TYMPANOMETRY: CPT | Performed by: AUDIOLOGIST

## 2022-06-27 PROCEDURE — 1123F ACP DISCUSS/DSCN MKR DOCD: CPT | Performed by: OTOLARYNGOLOGY

## 2022-06-27 PROCEDURE — 92557 COMPREHENSIVE HEARING TEST: CPT | Performed by: AUDIOLOGIST

## 2022-06-27 PROCEDURE — G8427 DOCREV CUR MEDS BY ELIG CLIN: HCPCS | Performed by: OTOLARYNGOLOGY

## 2022-06-27 PROCEDURE — 1036F TOBACCO NON-USER: CPT | Performed by: OTOLARYNGOLOGY

## 2022-06-27 PROCEDURE — G8399 PT W/DXA RESULTS DOCUMENT: HCPCS | Performed by: OTOLARYNGOLOGY

## 2022-06-27 PROCEDURE — 99214 OFFICE O/P EST MOD 30 MIN: CPT | Performed by: OTOLARYNGOLOGY

## 2022-06-27 PROCEDURE — 3017F COLORECTAL CA SCREEN DOC REV: CPT | Performed by: OTOLARYNGOLOGY

## 2022-06-27 PROCEDURE — 1090F PRES/ABSN URINE INCON ASSESS: CPT | Performed by: OTOLARYNGOLOGY

## 2022-06-27 PROCEDURE — G8420 CALC BMI NORM PARAMETERS: HCPCS | Performed by: OTOLARYNGOLOGY

## 2022-06-27 RX ORDER — CANDESARTAN CILEXETIL AND HYDROCHLOROTHIAZIDE 16; 12.5 MG/1; MG/1
1 TABLET ORAL DAILY
Qty: 90 TABLET | Refills: 1 | Status: SHIPPED | OUTPATIENT
Start: 2022-06-27

## 2022-06-27 NOTE — PROGRESS NOTES
This patient was referred for audiometric/tympanometric testing by Dr. Tito Powell due to Meniere's disease, right ear. Audiometry using pure tone air and bone conduction testing revealed a mild hearing loss, at 6000-8000Hz, left ear and a moderate sensorineural hearing loss, through the frequency range, right ear. Reliability was good. Speech reception thresholds were in good agreement with the pure tone averages, bilaterally. Speech discrimination scores were 92%, right ear at 70dBHL and 100%, left ear at 50dBHL. Tympanometry revealed normal middle ear peak pressure and reduced compliance, bilaterally. Ipsilateral acoustic reflexes were present, right ear and absent, left ear at 1000Hz. The results were reviewed with the patient. Recommendations for follow up will be made pending physician consult.     Brian Corley CCC/TONIE  Audiologist  [id number]  NPI#:  [id number]

## 2022-06-28 ENCOUNTER — TELEPHONE (OUTPATIENT)
Dept: AUDIOLOGY | Age: 72
End: 2022-06-28

## 2022-06-28 ENCOUNTER — OFFICE VISIT (OUTPATIENT)
Dept: PRIMARY CARE CLINIC | Age: 72
End: 2022-06-28
Payer: MEDICARE

## 2022-06-28 VITALS
OXYGEN SATURATION: 98 % | WEIGHT: 135.4 LBS | DIASTOLIC BLOOD PRESSURE: 58 MMHG | BODY MASS INDEX: 23.99 KG/M2 | TEMPERATURE: 98.2 F | HEART RATE: 64 BPM | HEIGHT: 63 IN | RESPIRATION RATE: 14 BRPM | SYSTOLIC BLOOD PRESSURE: 118 MMHG

## 2022-06-28 DIAGNOSIS — E03.9 HYPOTHYROIDISM, UNSPECIFIED TYPE: ICD-10-CM

## 2022-06-28 DIAGNOSIS — Z00.00 INITIAL MEDICARE ANNUAL WELLNESS VISIT: Primary | ICD-10-CM

## 2022-06-28 PROCEDURE — 1123F ACP DISCUSS/DSCN MKR DOCD: CPT | Performed by: FAMILY MEDICINE

## 2022-06-28 PROCEDURE — 3017F COLORECTAL CA SCREEN DOC REV: CPT | Performed by: FAMILY MEDICINE

## 2022-06-28 PROCEDURE — G0438 PPPS, INITIAL VISIT: HCPCS | Performed by: FAMILY MEDICINE

## 2022-06-28 RX ORDER — LEVOTHYROXINE AND LIOTHYRONINE 38; 9 UG/1; UG/1
60 TABLET ORAL DAILY
Qty: 90 TABLET | Refills: 3 | Status: SHIPPED | OUTPATIENT
Start: 2022-06-28

## 2022-06-28 SDOH — ECONOMIC STABILITY: FOOD INSECURITY: WITHIN THE PAST 12 MONTHS, THE FOOD YOU BOUGHT JUST DIDN'T LAST AND YOU DIDN'T HAVE MONEY TO GET MORE.: NEVER TRUE

## 2022-06-28 SDOH — ECONOMIC STABILITY: FOOD INSECURITY: WITHIN THE PAST 12 MONTHS, YOU WORRIED THAT YOUR FOOD WOULD RUN OUT BEFORE YOU GOT MONEY TO BUY MORE.: NEVER TRUE

## 2022-06-28 ASSESSMENT — SOCIAL DETERMINANTS OF HEALTH (SDOH): HOW HARD IS IT FOR YOU TO PAY FOR THE VERY BASICS LIKE FOOD, HOUSING, MEDICAL CARE, AND HEATING?: NOT HARD AT ALL

## 2022-06-28 NOTE — PROGRESS NOTES
Medicare Annual Wellness Visit    Rosy Kent is here for Medicare AWV (concerned about scoliosis and getting older what affects that has on her) and Dizziness (has had several weak and dizzy incidents not sure if Charisseieres is getting worse or something new)    Assessment & Plan   Initial Medicare annual wellness visit      Recommendations for Preventive Services Due: see orders and patient instructions/AVS.  Recommended screening schedule for the next 5-10 years is provided to the patient in written form: see Patient Instructions/AVS.     Return for Medicare Annual Wellness Visit in 1 year. Subjective     Doing well overall. Having vertigo issues- following w Dr. Author Burgess- prescribed candesartan-HCTZ 16-12.5mg yesterday. Has not taken yet. Otherwise doing well and offers no complaints. Patient's complete Health Risk Assessment and screening values have been reviewed and are found in Flowsheets. The following problems were reviewed today and where indicated follow up appointments were made and/or referrals ordered. Positive Risk Factor Screenings with Interventions:                Safety:  Do you have working smoke detectors?: Yes  Do you have any tripping hazards - loose or unsecured carpets or rugs?: No  Do you have any tripping hazards - clutter in doorways, halls, or stairs?: No  Do you have either shower bars, grab bars, non-slip mats or non-slip surfaces in your shower or bathtub?: (!) No  Do all of your stairways have a railing or banister?: Yes  Do you always fasten your seatbelt when you are in a car?: Yes    Safety Interventions:  · Home safety tips provided           Objective   Vitals:    06/28/22 1011   BP: (!) 118/58   Pulse: 64   Resp: 14   Temp: 98.2 °F (36.8 °C)   TempSrc: Temporal   SpO2: 98%   Weight: 135 lb 6.4 oz (61.4 kg)   Height: 5' 3\" (1.6 m)      Body mass index is 23.99 kg/m².         General Appearance: alert and oriented to person, place and time, well developed and well- nourished, in no acute distress  Skin: warm and dry, no rash or erythema  Head: normocephalic and atraumatic  Eyes: pupils equal, round, and reactive to light, extraocular eye movements intact, conjunctivae normal  ENT: tympanic membrane, external ear and ear canal normal bilaterally, nose without deformity, nasal mucosa and turbinates normal without polyps  Neck: supple and non-tender without mass, no thyromegaly or thyroid nodules, no cervical lymphadenopathy  Pulmonary/Chest: clear to auscultation bilaterally- no wheezes, rales or rhonchi, normal air movement, no respiratory distress  Cardiovascular: normal rate, regular rhythm, normal S1 and S2, no murmurs, rubs, clicks, or gallops, distal pulses intact, no carotid bruits  Abdomen: soft, non-tender, non-distended, normal bowel sounds, no masses or organomegaly  Extremities: no cyanosis, clubbing or edema  Musculoskeletal: normal range of motion, no joint swelling, deformity or tenderness  Neurologic: reflexes normal and symmetric, no cranial nerve deficit, gait, coordination and speech normal       Allergies   Allergen Reactions    Phenergan [Promethazine Hcl] Other (See Comments)     Developed a phlebitis after IV administration    Risedronate      joint pain    Sulfa Antibiotics Hives    Tetanus Toxoids Hives    Demerol Hcl [Meperidine] Nausea And Vomiting     After pt had IV demerol became very nauseated with vomiting     Prior to Visit Medications    Medication Sig Taking?  Authorizing Provider   Cetirizine HCl (ZYRTEC PO) Take by mouth daily as needed Yes Historical Provider, MD   estradiol (ESTRACE) 0.1 MG/GM vaginal cream Place 2 g vaginally daily Yes Historical Provider, MD   Coenzyme Q10 (COQ10) 100 MG CAPS Take by mouth Yes Historical Provider, MD   Red Yeast Rice Extract (RED YEAST RICE PO) Take 2 tablets by mouth daily Yes Historical Provider, MD   Multiple Vitamins-Minerals (MULTIVITAMIN ADULTS 50+) TABS Take 1 tablet by mouth daily Yes Historical Provider, MD   thyroid (ARMOUR) 60 MG tablet Take 60 mg by mouth daily Yes Historical Provider, MD   mesalamine (LIALDA) 1.2 G EC tablet Take 1,200 mg by mouth daily (with breakfast) Yes Historical Provider, MD   Probiotic Product (PROBIOTIC DAILY PO) Take by mouth Yes Historical Provider, MD   folic acid (FOLVITE) 1 MG tablet Take 1 mg by mouth daily. Yes Historical Provider, MD   Calcium Carbonate-Vitamin D (CALCIUM-VITAMIN D) 500-200 MG-UNIT per tablet Take 1 tablet by mouth daily  Yes Historical Provider, MD   Omega-3 Fatty Acids (OMEGA 3 PO) Take  by mouth.  Yes Historical Provider, MD   candesartan-hydroCHLOROthiazide (ATACAND HCT) 16-12.5 MG per tablet Take 1 tablet by mouth daily  Patient not taking: Reported on 6/28/2022  Armani Michaels DO       [facility] (Including outside providers/suppliers regularly involved in providing care):   Patient Care Team:  Maral Victoria DO as PCP - General (Family Medicine)  Maral Victoria DO as PCP - [facility] Empaneled Provider     Reviewed and updated this visit:  Tobacco  Allergies  Meds  Med Hx  Surg Hx  Soc Hx  Fam Hx                  Precilla Julian Sanderson DO  6/28/22  1:31 PM

## 2022-06-28 NOTE — PATIENT INSTRUCTIONS
Personalized Preventive Plan for Eloy Baig - 6/28/2022  Medicare offers a range of preventive health benefits. Some of the tests and screenings are paid in full while other may be subject to a deductible, co-insurance, and/or copay. Some of these benefits include a comprehensive review of your medical history including lifestyle, illnesses that may run in your family, and various assessments and screenings as appropriate. After reviewing your medical record and screening and assessments performed today your provider may have ordered immunizations, labs, imaging, and/or referrals for you. A list of these orders (if applicable) as well as your Preventive Care list are included within your After Visit Summary for your review. Other Preventive Recommendations:    · A preventive eye exam performed by an eye specialist is recommended every 1-2 years to screen for glaucoma; cataracts, macular degeneration, and other eye disorders. · A preventive dental visit is recommended every 6 months. · Try to get at least 150 minutes of exercise per week or 10,000 steps per day on a pedometer . · Order or download the FREE \"Exercise & Physical Activity: Your Everyday Guide\" from [employer]. Call 3-593.659.4530 or search [employer] online. · You need 9662-5588 mg of calcium and 7892-0126 IU of vitamin D per day. It is possible to meet your calcium requirement with diet alone, but a vitamin D supplement is usually necessary to meet this goal.  · When exposed to the sun, use a sunscreen that protects against both UVA and UVB radiation with an SPF of 30 or greater. Reapply every 2 to 3 hours or after sweating, drying off with a towel, or swimming. · Always wear a seat belt when traveling in a car. Always wear a helmet when riding a bicycle or motorcycle.

## 2022-07-23 ASSESSMENT — ENCOUNTER SYMPTOMS
SHORTNESS OF BREATH: 0
COUGH: 0
VOMITING: 0

## 2022-08-10 ENCOUNTER — OFFICE VISIT (OUTPATIENT)
Dept: ENT CLINIC | Age: 72
End: 2022-08-10
Payer: MEDICARE

## 2022-08-10 VITALS
SYSTOLIC BLOOD PRESSURE: 133 MMHG | DIASTOLIC BLOOD PRESSURE: 83 MMHG | BODY MASS INDEX: 23.99 KG/M2 | HEIGHT: 63 IN | HEART RATE: 84 BPM

## 2022-08-10 DIAGNOSIS — H81.01 MENIERES DISEASE, RIGHT: Primary | ICD-10-CM

## 2022-08-10 PROCEDURE — 3017F COLORECTAL CA SCREEN DOC REV: CPT | Performed by: OTOLARYNGOLOGY

## 2022-08-10 PROCEDURE — 1036F TOBACCO NON-USER: CPT | Performed by: OTOLARYNGOLOGY

## 2022-08-10 PROCEDURE — G8420 CALC BMI NORM PARAMETERS: HCPCS | Performed by: OTOLARYNGOLOGY

## 2022-08-10 PROCEDURE — 99213 OFFICE O/P EST LOW 20 MIN: CPT | Performed by: OTOLARYNGOLOGY

## 2022-08-10 PROCEDURE — G8427 DOCREV CUR MEDS BY ELIG CLIN: HCPCS | Performed by: OTOLARYNGOLOGY

## 2022-08-10 PROCEDURE — 1123F ACP DISCUSS/DSCN MKR DOCD: CPT | Performed by: OTOLARYNGOLOGY

## 2022-08-10 PROCEDURE — 1090F PRES/ABSN URINE INCON ASSESS: CPT | Performed by: OTOLARYNGOLOGY

## 2022-08-10 PROCEDURE — G8399 PT W/DXA RESULTS DOCUMENT: HCPCS | Performed by: OTOLARYNGOLOGY

## 2022-08-10 ASSESSMENT — ENCOUNTER SYMPTOMS
VOMITING: 0
SHORTNESS OF BREATH: 0
COUGH: 0

## 2022-08-10 NOTE — PROGRESS NOTES
[facility] Otolaryngology  Dr. Ivy Zambrano. Liz Chun. Ms.Ed        Patient Name:  Gonzaelz Kimble  :  1950     CHIEF C/O:    Chief Complaint   Patient presents with    Follow-up     6wk f/u menieres        HISTORY OBTAINED FROM:  patient    HISTORY OF PRESENT ILLNESS:       Johnna Smith is a 81y.o. year old female, here today for follow up for known history of Ménière's disease. Patient states right-sided hearing loss is stable. Was started on hydrochlorothiazide at last visit, and pt reports no significant episodes of dizziness since then. Bilateral tinnitus is stable, as well. Had balance therapy in the past with some improvement.       Past Medical History:   Diagnosis Date    Hyperlipidemia     Meniere's disease     Scoliosis     Thoracic outlet syndrome     Thyroid disease     Ulcerative colitis ([facility] [address]      Past Surgical History:   Procedure Laterality Date    COLONOSCOPY      has had several    COLONOSCOPY  12/17/15    bx done, diverticulosis    COLONOSCOPY  2017    multiple biopsy    COLONOSCOPY N/A 2020    COLONOSCOPY WITH BIOPSY performed by Deronda Saint, MD at [facility] N/A 2022    COLONOSCOPY WITH BIOPSY performed by Deronda Saint, MD at [address] [place] [address]    umbilical    SKIN CANCER EXCISION      nose    [id number] [facility]       Current Outpatient Medications:     thyroid (ARMOUR) 60 MG tablet, Take 1 tablet by mouth daily, Disp: 90 tablet, Rfl: 3    candesartan-hydroCHLOROthiazide (ATACAND HCT) 16-12.5 MG per tablet, Take 1 tablet by mouth daily, Disp: 90 tablet, Rfl: 1    Cetirizine HCl (ZYRTEC PO), Take by mouth daily as needed, Disp: , Rfl:     estradiol (ESTRACE) 0.1 MG/GM vaginal cream, Place 2 g vaginally daily, Disp: , Rfl:     Coenzyme Q10 (COQ10) 100 MG CAPS, Take by mouth, Disp: , Rfl:     Red Yeast Rice Extract (RED YEAST RICE PO), Take 2 tablets by mouth daily, Disp: , Rfl:     Multiple Vitamins-Minerals (MULTIVITAMIN ADULTS 50+) TABS, Take 1 tablet by mouth daily, Disp: , Rfl:     mesalamine (LIALDA) 1.2 G EC tablet, Take 1,200 mg by mouth daily (with breakfast), Disp: , Rfl:     Probiotic Product (PROBIOTIC DAILY PO), Take by mouth, Disp: , Rfl:     folic acid (FOLVITE) 1 MG tablet, Take 1 mg by mouth daily. , Disp: , Rfl:     Calcium Carbonate-Vitamin D (CALCIUM-VITAMIN D) 500-200 MG-UNIT per tablet, Take 1 tablet by mouth daily , Disp: , Rfl:     Omega-3 Fatty Acids (OMEGA 3 PO), Take  by mouth., Disp: , Rfl:   Phenergan [promethazine hcl], Risedronate, Sulfa antibiotics, Tetanus toxoids, and Demerol hcl [meperidine]  Social History     Tobacco Use    Smoking status: Former     Packs/day: 0.50     Years: 30.00     Pack years: 15.00     Types: Cigarettes     Start date: 5     Quit date: 2000     Years since quittin.5    Smokeless tobacco: Never   Vaping Use    Vaping Use: Never used   Substance Use Topics    Alcohol use: Yes     Comment: .5 beer weekly. 2 cups coffee per day    Drug use: No     Family History   Problem Relation Age of Onset    Breast Cancer Mother 28    Breast Cancer Paternal Aunt        Review of Systems   Constitutional:  Negative for chills and fever. HENT:  Positive for hearing loss and tinnitus. Negative for ear discharge. Respiratory:  Negative for cough and shortness of breath. Cardiovascular:  Negative for chest pain and palpitations. Gastrointestinal:  Negative for vomiting. Skin:  Negative for rash. Allergic/Immunologic: Negative for environmental allergies. Neurological:  Positive for dizziness. Negative for headaches. Hematological:  Does not bruise/bleed easily. All other systems reviewed and are negative. /83 (Site: Left Upper Arm, Position: Sitting, Cuff Size: Medium Adult)   Pulse 84   Ht 5' 3\" (1.6 m)   BMI 23.99 kg/m²   Physical Exam  Vitals and nursing note reviewed. Constitutional:       Appearance: She is well-developed.    HENT: Head: Normocephalic and atraumatic. Right Ear: Tympanic membrane and ear canal normal. Decreased hearing noted. There is no impacted cerumen. No foreign body. No mastoid tenderness. No PE tube. No hemotympanum. Left Ear: Tympanic membrane and ear canal normal. There is no impacted cerumen. No foreign body. No mastoid tenderness. No PE tube. Nose: No congestion or rhinorrhea. Mouth/Throat:      Lips: No lesions. Mouth: No injury or oral lesions. Eyes:      Pupils: Pupils are equal, round, and reactive to light. Neck:      Thyroid: No thyromegaly. Trachea: No tracheal deviation. Cardiovascular:      Rate and Rhythm: Normal rate. Pulmonary:      Effort: Pulmonary effort is normal. No respiratory distress. Musculoskeletal:         General: Normal range of motion. Cervical back: Normal range of motion. Lymphadenopathy:      Cervical: No cervical adenopathy. Skin:     General: Skin is warm. Findings: No erythema. Neurological:      Mental Status: She is alert. Cranial Nerves: No cranial nerve deficit. IMPRESSION/PLAN:  Patient seen and examined for suspected Ménière's disease. Continue stabilizing with hydrochlorothiazide daily, continuing a close low salt diet, add Valium as needed for room spinning vertigo. Follow-up in 9 months. **Pt will be in [place] until 05/2023**    Dr. Moustapha Hernandez.  Otolaryngology Facial Plastic Surgery  :  [facility] Otolaryngology/Facial Plastic Surgery Residency  Associate Clinical Professor:  Elsy Arriaga [facility] [place]  1950      I have discussed the case, including pertinent history and exam findings with the resident. I have seen and examined the patient and the key elements of the encounter have been performed by me. I agree with the assessment, plan and orders as documented by the resident.       Patient here for follow up of medical problems. Remainder of medical problems as per resident note.       [facility],   9/8/22

## 2022-09-27 ENCOUNTER — HOSPITAL ENCOUNTER (OUTPATIENT)
Dept: GENERAL RADIOLOGY | Age: 72
Discharge: HOME | End: 2022-09-29
Payer: MEDICARE

## 2022-09-27 DIAGNOSIS — Z12.31 VISIT FOR SCREENING MAMMOGRAM: ICD-10-CM

## 2022-09-27 PROCEDURE — 77063 BREAST TOMOSYNTHESIS BI: CPT

## 2023-02-03 ENCOUNTER — TELEPHONE (OUTPATIENT)
Dept: ENT CLINIC | Age: 73
End: 2023-02-03

## 2023-02-03 DIAGNOSIS — H81.01 MENIERES DISEASE, RIGHT: Primary | ICD-10-CM

## 2023-02-03 RX ORDER — DIAZEPAM 2 MG/1
2 TABLET ORAL EVERY 8 HOURS PRN
Qty: 10 TABLET | Refills: 0 | OUTPATIENT
Start: 2023-02-03 | End: 2023-02-17

## 2023-02-03 RX ORDER — ONDANSETRON 4 MG/1
4 TABLET, FILM COATED ORAL 3 TIMES DAILY PRN
Qty: 15 TABLET | Refills: 0 | OUTPATIENT
Start: 2023-02-03

## 2023-02-03 NOTE — TELEPHONE ENCOUNTER
Spoke to Dr Malgorzata Powers and he would like to give her Valium 2 mg and zofran. If not the Valium then maybe meclizine        Called and spoke to pt and she is having vertigo with nausea and vomiting. Would prefer her rx if he can call them into [facility] in [place]. All of my other rx from home I have transferred to here. Try there first then I do have a [facility] near me .  [facility] - [phone] and [facility]- [phone]        Called [facility] and they are closed til 3 for meal break

## 2023-02-03 NOTE — TELEPHONE ENCOUNTER
Pt called and stated that she is in [place] and having problems with vertigo. Nausea, vomiting, and vertigo .  Wants to know if there is anything you can do for her

## 2023-02-03 NOTE — TELEPHONE ENCOUNTER
Called the pharmacy and gave rx to Thomas Murray at [facility] pt back and informed that rx for valium and zofran went to [facility] pharmacy

## 2023-04-24 ENCOUNTER — TELEPHONE (OUTPATIENT)
Dept: PRIMARY CARE CLINIC | Age: 73
End: 2023-04-24

## 2023-04-24 NOTE — TELEPHONE ENCOUNTER
Left VM to return call to the office. Patient called right back. She is requesting that you please place the order for her yearly labs. Patient is still in [place], but would like to get them done when she returns. Thank you.

## 2023-04-24 NOTE — TELEPHONE ENCOUNTER
----- Message from Fran Davis sent at 4/24/2023  1:18 PM EDT -----  Subject: Referral Request    Reason for referral request? Labs  Provider patient wants to be referred to(if known):     Provider Phone Number(if known): Additional Information for Provider?  Full Panel Bloodwork  ---------------------------------------------------------------------------  --------------  Sergio Herbert [facility]    [phone]; OK to leave message on voicemail  ---------------------------------------------------------------------------  --------------

## 2023-04-25 ENCOUNTER — TELEPHONE (OUTPATIENT)
Dept: PRIMARY CARE CLINIC | Age: 73
End: 2023-04-25

## 2023-04-25 DIAGNOSIS — E03.9 HYPOTHYROIDISM, UNSPECIFIED TYPE: Primary | ICD-10-CM

## 2023-06-20 DIAGNOSIS — E03.9 HYPOTHYROIDISM, UNSPECIFIED TYPE: ICD-10-CM

## 2023-06-20 LAB
ALBUMIN SERPL-MCNC: 4.5 G/DL (ref 3.5–5.2)
ALP SERPL-CCNC: 50 U/L (ref 35–104)
ALT SERPL-CCNC: 18 U/L (ref 0–32)
ANION GAP SERPL CALCULATED.3IONS-SCNC: 12 MMOL/L (ref 7–16)
AST SERPL-CCNC: 24 U/L (ref 0–31)
BILIRUB SERPL-MCNC: 0.3 MG/DL (ref 0–1.2)
BUN SERPL-MCNC: 13 MG/DL (ref 6–23)
CALCIUM SERPL-MCNC: 9.6 MG/DL (ref 8.6–10.2)
CHLORIDE SERPL-SCNC: 104 MMOL/L (ref 98–107)
CHOLESTEROL, TOTAL: 235 MG/DL (ref 0–199)
CO2 SERPL-SCNC: 24 MMOL/L (ref 22–29)
CREAT SERPL-MCNC: 1 MG/DL (ref 0.5–1)
ERYTHROCYTE [DISTWIDTH] IN BLOOD BY AUTOMATED COUNT: 12.5 FL (ref 11.5–15)
GLUCOSE SERPL-MCNC: 91 MG/DL (ref 74–99)
HCT VFR BLD AUTO: 36.8 % (ref 34–48)
HDLC SERPL-MCNC: 70 MG/DL
HGB BLD-MCNC: 11.6 G/DL (ref 11.5–15.5)
LDLC SERPL CALC-MCNC: 143 MG/DL (ref 0–99)
MCH RBC QN AUTO: 31.6 PG (ref 26–35)
MCHC RBC AUTO-ENTMCNC: 31.5 % (ref 32–34.5)
MCV RBC AUTO: 100.3 FL (ref 80–99.9)
PLATELET # BLD AUTO: 199 E9/L (ref 130–450)
PMV BLD AUTO: 10.7 FL (ref 7–12)
POTASSIUM SERPL-SCNC: 4.4 MMOL/L (ref 3.5–5)
PROT SERPL-MCNC: 6.9 G/DL (ref 6.4–8.3)
RBC # BLD AUTO: 3.67 E12/L (ref 3.5–5.5)
SODIUM SERPL-SCNC: 140 MMOL/L (ref 132–146)
TRIGL SERPL-MCNC: 111 MG/DL (ref 0–149)
TSH SERPL-MCNC: 3.41 UIU/ML (ref 0.27–4.2)
VLDLC SERPL CALC-MCNC: 22 MG/DL
WBC # BLD: 4.7 E9/L (ref 4.5–11.5)

## 2023-06-22 SDOH — HEALTH STABILITY: PHYSICAL HEALTH: ON AVERAGE, HOW MANY MINUTES DO YOU ENGAGE IN EXERCISE AT THIS LEVEL?: 20 MIN

## 2023-06-22 SDOH — HEALTH STABILITY: PHYSICAL HEALTH: ON AVERAGE, HOW MANY DAYS PER WEEK DO YOU ENGAGE IN MODERATE TO STRENUOUS EXERCISE (LIKE A BRISK WALK)?: 5 DAYS

## 2023-06-22 ASSESSMENT — LIFESTYLE VARIABLES
HOW MANY STANDARD DRINKS CONTAINING ALCOHOL DO YOU HAVE ON A TYPICAL DAY: 1
HOW OFTEN DO YOU HAVE A DRINK CONTAINING ALCOHOL: 2-3 TIMES A WEEK
HOW OFTEN DO YOU HAVE SIX OR MORE DRINKS ON ONE OCCASION: 1
HOW OFTEN DO YOU HAVE A DRINK CONTAINING ALCOHOL: 4
HOW MANY STANDARD DRINKS CONTAINING ALCOHOL DO YOU HAVE ON A TYPICAL DAY: 1 OR 2

## 2023-06-22 ASSESSMENT — PATIENT HEALTH QUESTIONNAIRE - PHQ9
2. FEELING DOWN, DEPRESSED OR HOPELESS: 0
SUM OF ALL RESPONSES TO PHQ9 QUESTIONS 1 & 2: 0
SUM OF ALL RESPONSES TO PHQ QUESTIONS 1-9: 0
SUM OF ALL RESPONSES TO PHQ QUESTIONS 1-9: 0
1. LITTLE INTEREST OR PLEASURE IN DOING THINGS: 0
SUM OF ALL RESPONSES TO PHQ QUESTIONS 1-9: 0
SUM OF ALL RESPONSES TO PHQ QUESTIONS 1-9: 0

## 2023-06-26 SDOH — ECONOMIC STABILITY: FOOD INSECURITY: WITHIN THE PAST 12 MONTHS, THE FOOD YOU BOUGHT JUST DIDN'T LAST AND YOU DIDN'T HAVE MONEY TO GET MORE.: NEVER TRUE

## 2023-06-26 SDOH — ECONOMIC STABILITY: FOOD INSECURITY: WITHIN THE PAST 12 MONTHS, YOU WORRIED THAT YOUR FOOD WOULD RUN OUT BEFORE YOU GOT MONEY TO BUY MORE.: NEVER TRUE

## 2023-06-26 SDOH — ECONOMIC STABILITY: INCOME INSECURITY: HOW HARD IS IT FOR YOU TO PAY FOR THE VERY BASICS LIKE FOOD, HOUSING, MEDICAL CARE, AND HEATING?: NOT HARD AT ALL

## 2023-06-26 SDOH — ECONOMIC STABILITY: TRANSPORTATION INSECURITY
IN THE PAST 12 MONTHS, HAS LACK OF TRANSPORTATION KEPT YOU FROM MEETINGS, WORK, OR FROM GETTING THINGS NEEDED FOR DAILY LIVING?: NO

## 2023-06-26 SDOH — ECONOMIC STABILITY: HOUSING INSECURITY
IN THE LAST 12 MONTHS, WAS THERE A TIME WHEN YOU DID NOT HAVE A STEADY PLACE TO SLEEP OR SLEPT IN A SHELTER (INCLUDING NOW)?: NO

## 2023-06-29 ENCOUNTER — OFFICE VISIT (OUTPATIENT)
Dept: PRIMARY CARE CLINIC | Age: 73
End: 2023-06-29

## 2023-06-29 VITALS
HEIGHT: 63 IN | BODY MASS INDEX: 25.2 KG/M2 | SYSTOLIC BLOOD PRESSURE: 118 MMHG | DIASTOLIC BLOOD PRESSURE: 60 MMHG | HEART RATE: 85 BPM | WEIGHT: 142.2 LBS | TEMPERATURE: 97.9 F | OXYGEN SATURATION: 100 %

## 2023-06-29 DIAGNOSIS — R42 VERTIGO: ICD-10-CM

## 2023-06-29 DIAGNOSIS — Z00.00 MEDICARE ANNUAL WELLNESS VISIT, SUBSEQUENT: Primary | ICD-10-CM

## 2023-06-29 DIAGNOSIS — E03.9 HYPOTHYROIDISM, UNSPECIFIED TYPE: ICD-10-CM

## 2023-06-29 DIAGNOSIS — E78.2 MIXED HYPERLIPIDEMIA: ICD-10-CM

## 2023-06-29 RX ORDER — ONDANSETRON 4 MG/1
4 TABLET, ORALLY DISINTEGRATING ORAL 3 TIMES DAILY PRN
Qty: 21 TABLET | Refills: 0 | Status: SHIPPED | OUTPATIENT
Start: 2023-06-29

## 2023-06-29 RX ORDER — IMIQUIMOD 12.5 MG/.25G
CREAM TOPICAL
COMMUNITY
Start: 2023-05-22

## 2023-06-29 RX ORDER — PRAVASTATIN SODIUM 20 MG
20 TABLET ORAL DAILY
Qty: 90 TABLET | Refills: 0 | Status: SHIPPED | OUTPATIENT
Start: 2023-06-29

## 2023-06-29 RX ORDER — LEVOTHYROXINE AND LIOTHYRONINE 38; 9 UG/1; UG/1
60 TABLET ORAL DAILY
Qty: 90 TABLET | Refills: 3 | Status: SHIPPED | OUTPATIENT
Start: 2023-06-29

## 2023-06-30 ENCOUNTER — PROCEDURE VISIT (OUTPATIENT)
Dept: AUDIOLOGY | Age: 73
End: 2023-06-30

## 2023-06-30 ENCOUNTER — OFFICE VISIT (OUTPATIENT)
Dept: ENT CLINIC | Age: 73
End: 2023-06-30
Payer: MEDICARE

## 2023-06-30 VITALS
BODY MASS INDEX: 25.11 KG/M2 | HEIGHT: 63 IN | SYSTOLIC BLOOD PRESSURE: 123 MMHG | WEIGHT: 141.7 LBS | DIASTOLIC BLOOD PRESSURE: 66 MMHG | HEART RATE: 71 BPM

## 2023-06-30 DIAGNOSIS — H90.A21 SENSORINEURAL HEARING LOSS (SNHL) OF RIGHT EAR WITH RESTRICTED HEARING OF LEFT EAR: Primary | ICD-10-CM

## 2023-06-30 DIAGNOSIS — H81.01 MENIERES DISEASE, RIGHT: ICD-10-CM

## 2023-06-30 DIAGNOSIS — H93.13 TINNITUS OF BOTH EARS: ICD-10-CM

## 2023-06-30 PROCEDURE — 1090F PRES/ABSN URINE INCON ASSESS: CPT | Performed by: OTOLARYNGOLOGY

## 2023-06-30 PROCEDURE — 99213 OFFICE O/P EST LOW 20 MIN: CPT | Performed by: OTOLARYNGOLOGY

## 2023-06-30 PROCEDURE — 1036F TOBACCO NON-USER: CPT | Performed by: OTOLARYNGOLOGY

## 2023-06-30 PROCEDURE — 3017F COLORECTAL CA SCREEN DOC REV: CPT | Performed by: OTOLARYNGOLOGY

## 2023-06-30 PROCEDURE — G8427 DOCREV CUR MEDS BY ELIG CLIN: HCPCS | Performed by: OTOLARYNGOLOGY

## 2023-06-30 PROCEDURE — G8399 PT W/DXA RESULTS DOCUMENT: HCPCS | Performed by: OTOLARYNGOLOGY

## 2023-06-30 PROCEDURE — 1123F ACP DISCUSS/DSCN MKR DOCD: CPT | Performed by: OTOLARYNGOLOGY

## 2023-06-30 PROCEDURE — G8419 CALC BMI OUT NRM PARAM NOF/U: HCPCS | Performed by: OTOLARYNGOLOGY

## 2023-06-30 ASSESSMENT — ENCOUNTER SYMPTOMS
VOMITING: 0
VOICE CHANGE: 0
RHINORRHEA: 0
TROUBLE SWALLOWING: 0
SHORTNESS OF BREATH: 0
COUGH: 0
SORE THROAT: 0

## 2023-08-04 ENCOUNTER — TELEPHONE (OUTPATIENT)
Dept: PRIMARY CARE CLINIC | Age: 73
End: 2023-08-04

## 2023-08-04 DIAGNOSIS — E03.9 HYPOTHYROIDISM, UNSPECIFIED TYPE: Primary | ICD-10-CM

## 2023-08-04 RX ORDER — LEVOTHYROXINE SODIUM 0.1 MG/1
100 TABLET ORAL DAILY
Qty: 90 TABLET | Refills: 1 | Status: SHIPPED | OUTPATIENT
Start: 2023-08-04

## 2023-08-04 NOTE — TELEPHONE ENCOUNTER
Notified patient's spouse of the following per Dr. Pati Carpio:     Please let pt know to stop her armour thyroid. Levothyroxine 100mcg sent to pharmacy. Have TSH completed in 4-6 wks please.

## 2023-08-04 NOTE — TELEPHONE ENCOUNTER
----- Message from [facility],  sent at 8/4/2023 11:33 AM EDT -----  Please let pt know to stop her armour thyroid. Levothyroxine 100mcg sent to pharmacy. Have TSH completed in 4-6 wks please.

## 2023-09-19 DIAGNOSIS — E03.9 HYPOTHYROIDISM, UNSPECIFIED TYPE: ICD-10-CM

## 2023-09-19 DIAGNOSIS — E78.2 MIXED HYPERLIPIDEMIA: ICD-10-CM

## 2023-09-19 LAB
CHOLESTEROL: 175 MG/DL
HDLC SERPL-MCNC: 69 MG/DL
LDL CHOLESTEROL: 90 MG/DL
TRIGL SERPL-MCNC: 80 MG/DL
TSH SERPL DL<=0.05 MIU/L-ACNC: 0.43 UIU/ML (ref 0.27–4.2)
VLDLC SERPL CALC-MCNC: 16 MG/DL

## 2023-09-27 RX ORDER — PRAVASTATIN SODIUM 20 MG
20 TABLET ORAL DAILY
Qty: 90 TABLET | Refills: 0 | Status: SHIPPED | OUTPATIENT
Start: 2023-09-27

## 2023-09-27 NOTE — TELEPHONE ENCOUNTER
PC to pt notifying Rx sent to pharmacy and will be due for labs in December. Pt voiced understanding.

## 2023-09-28 ENCOUNTER — HOSPITAL ENCOUNTER (OUTPATIENT)
Dept: GENERAL RADIOLOGY | Age: 73
Discharge: HOME | End: 2023-09-30
Payer: MEDICARE

## 2023-09-28 VITALS — BODY MASS INDEX: 25.16 KG/M2 | HEIGHT: 63 IN | WEIGHT: 142 LBS

## 2023-09-28 DIAGNOSIS — Z12.31 VISIT FOR SCREENING MAMMOGRAM: ICD-10-CM

## 2023-09-28 PROCEDURE — 77063 BREAST TOMOSYNTHESIS BI: CPT

## 2023-10-05 ENCOUNTER — TELEPHONE (OUTPATIENT)
Dept: PRIMARY CARE CLINIC | Age: 73
End: 2023-10-05

## 2023-10-05 RX ORDER — PRAVASTATIN SODIUM 20 MG
20 TABLET ORAL DAILY
Qty: 90 TABLET | Refills: 1 | Status: SHIPPED | OUTPATIENT
Start: 2023-10-05

## 2023-10-05 NOTE — TELEPHONE ENCOUNTER
Patient had her lab recheck a couple of weeks ago    Asking if Dr. Olesya Pham can review her labs and let her know when she will be do for a recheck    She did get the refill for her cholesterol meds, but states it was her 90 day recheck of labs and wasn't sure when she would be due to check again? 6 months?     Please, advise

## 2023-10-06 NOTE — TELEPHONE ENCOUNTER
----- Message from Randell Perera DO sent at 10/5/2023  4:56 PM EDT -----  Labs reviewed. Please explained to patient that I did not receive these as they were ordered by Dr. Diaz Kc.  Cholesterol was much improved at 175. HDL was 69 and LDL was 90. Triglycerides were 80. Results were excellent. Recheck and fasting labs in 6 months.

## 2023-10-06 NOTE — TELEPHONE ENCOUNTER
PC to pt notifying of lab results. Pt stated she will be in [place] until May,  Asked if the lab orders can be placed and she can get them done there. Please advise.

## 2023-10-09 DIAGNOSIS — E78.2 MIXED HYPERLIPIDEMIA: ICD-10-CM

## 2023-10-09 DIAGNOSIS — E03.9 HYPOTHYROIDISM, UNSPECIFIED TYPE: Primary | ICD-10-CM

## 2023-10-09 PROBLEM — J30.9 ALLERGIC RHINITIS: Status: ACTIVE | Noted: 2021-07-01

## 2023-12-20 LAB
ALBUMIN SERPL-MCNC: 4.4 G/DL
ALP BLD-CCNC: 55 U/L
ALT SERPL-CCNC: 18 U/L
AST SERPL-CCNC: 24 U/L
BASOPHILS ABSOLUTE: 0 /ΜL
BASOPHILS RELATIVE PERCENT: 1 %
BILIRUB SERPL-MCNC: 0.4 MG/DL (ref 0.1–1.4)
BUN BLDV-MCNC: 14 MG/DL
CALCIUM SERPL-MCNC: 9.4 MG/DL
CHLORIDE BLD-SCNC: 104 MMOL/L
CHOLESTEROL, TOTAL: 177 MG/DL
CHOLESTEROL/HDL RATIO: NORMAL
CO2: 22 MMOL/L
CREAT SERPL-MCNC: 0.93 MG/DL
EOSINOPHILS ABSOLUTE: 0.2 /ΜL
EOSINOPHILS RELATIVE PERCENT: 4 %
GLUCOSE FASTING: 101 MG/DL
HCT VFR BLD CALC: 35.2 % (ref 36–46)
HDLC SERPL-MCNC: 64 MG/DL (ref 35–70)
HEMOGLOBIN: 12 G/DL (ref 12–16)
LDL CHOLESTEROL CALCULATED: 96 MG/DL (ref 0–160)
LYMPHOCYTES ABSOLUTE: 1.4 /ΜL
LYMPHOCYTES RELATIVE PERCENT: 39 %
MCH RBC QN AUTO: 32.2 PG
MCHC RBC AUTO-ENTMCNC: 34.1 G/DL
MCV RBC AUTO: 94 FL
MONOCYTES ABSOLUTE: 0.4 /ΜL
MONOCYTES RELATIVE PERCENT: 10 %
NEUTROPHILS ABSOLUTE: 1.7 /ΜL
NEUTROPHILS RELATIVE PERCENT: 46 %
NONHDLC SERPL-MCNC: NORMAL MG/DL
PLATELET # BLD: 166 K/ΜL
PMV BLD AUTO: ABNORMAL FL
POTASSIUM SERPL-SCNC: 4.3 MMOL/L
RBC # BLD: 3.73 10^6/ΜL
SODIUM BLD-SCNC: 140 MMOL/L
TOTAL PROTEIN: 6.4 G/DL (ref 6.4–8.2)
TRIGL SERPL-MCNC: 95 MG/DL
TSH SERPL DL<=0.05 MIU/L-ACNC: 0.44 UIU/ML
VLDLC SERPL CALC-MCNC: 17 MG/DL
WBC # BLD: 3.7 10^3/ML

## 2023-12-29 DIAGNOSIS — E03.9 HYPOTHYROIDISM, UNSPECIFIED TYPE: ICD-10-CM

## 2023-12-29 DIAGNOSIS — E78.2 MIXED HYPERLIPIDEMIA: ICD-10-CM

## 2024-01-17 DIAGNOSIS — E03.9 HYPOTHYROIDISM, UNSPECIFIED TYPE: ICD-10-CM

## 2024-01-17 RX ORDER — LEVOTHYROXINE SODIUM 0.1 MG/1
100 TABLET ORAL DAILY
Qty: 90 TABLET | Refills: 1 | Status: SHIPPED | OUTPATIENT
Start: 2024-01-17

## 2024-01-30 ENCOUNTER — TELEPHONE (OUTPATIENT)
Dept: ENT CLINIC | Age: 74
End: 2024-01-30

## 2024-01-30 NOTE — TELEPHONE ENCOUNTER
Patient called into office stating she is currently in [place], she started having Dizziness on Dec 18th, has been taking mecline drammaine zofran HCTZ, claritin/zyrtec otc  wants to know if theres something additional that she can do. I advised I would send Dr Villegas a message and call her back. Preferred pharamacy [facility] be [facility] [address] in [place], [place] and she can be called back at [phone].    Please advise.

## 2024-01-30 NOTE — TELEPHONE ENCOUNTER
Per Dr Villegas \"No recommendations she should see her provider in [place]\"     Called and notified pt who expressed understanding

## 2024-02-05 ENCOUNTER — TELEPHONE (OUTPATIENT)
Dept: PRIMARY CARE CLINIC | Age: 74
End: 2024-02-05

## 2024-02-05 NOTE — TELEPHONE ENCOUNTER
Pc to pt regarding referral. Pt states her dizziness/vertigo has really acted up with a vengeance . Insurance company need a referral. Pt is in [place] for the winter. Pt asked if you by chance knew of someone Critical access hospital otherwise request referral for [facility] Dr Yaz Flynn

## 2024-02-05 NOTE — TELEPHONE ENCOUNTER
Pc to pt states she has been taking Meclizine as well as Zofran. Pt is willingly to try a Medrol dose pack . [facility]

## 2024-02-05 NOTE — TELEPHONE ENCOUNTER
----- Message from Theodora Murphy sent at 2/5/2024  2:33 PM EST -----  Subject: Referral Request    Reason for referral request? Patient is currently in [place]. Patient is   requesting a referral for an ENT provider in [place]. [facility] Dr Yaz Flynn - [address] [place] [address] #   [phone] fax# [phone].  Provider patient wants to be referred to(if known):     Provider Phone Number(if known):    Additional Information for Provider? Please contact the patient with any   additional questions.   ---------------------------------------------------------------------------  --------------  CALL BACK INFO    [phone]; OK to leave message on voicemail  ---------------------------------------------------------------------------  --------------

## 2024-02-06 RX ORDER — METHYLPREDNISOLONE 4 MG/1
TABLET ORAL
Qty: 1 KIT | Refills: 0 | Status: SHIPPED | OUTPATIENT
Start: 2024-02-06

## 2024-02-06 NOTE — TELEPHONE ENCOUNTER
Prescription for a Medrol Dosepak was forwarded to [facility] in [facility].

## 2024-03-13 ENCOUNTER — TELEPHONE (OUTPATIENT)
Dept: PRIMARY CARE CLINIC | Age: 74
End: 2024-03-13

## 2024-03-13 NOTE — TELEPHONE ENCOUNTER
Pc from pt states she would like a referral to a neurologist in regards to her vertigo. She states she would see anyone however prefers not to see Dr Mckee. Pt will return home from [place] in May and realizes it may take some time to get into seeing someone so would like the orders placed ahead of time

## 2024-03-14 DIAGNOSIS — Z86.69 HISTORY OF MENIERE'S DISEASE: ICD-10-CM

## 2024-03-14 DIAGNOSIS — R42 VERTIGO: Primary | ICD-10-CM

## 2024-03-15 NOTE — TELEPHONE ENCOUNTER
Referral placed to [facility].    Pt notified and give phone number to reach out to office to schedule appt.

## 2024-03-18 LAB
ALBUMIN SERPL-MCNC: 4.5 G/DL
ALP BLD-CCNC: 51 U/L
ALT SERPL-CCNC: 14 U/L
ANION GAP SERPL CALCULATED.3IONS-SCNC: NORMAL MMOL/L
AST SERPL-CCNC: 21 U/L
BILIRUB SERPL-MCNC: 0.5 MG/DL (ref 0.1–1.4)
BUN BLDV-MCNC: 14 MG/DL
CALCIUM SERPL-MCNC: 9.1 MG/DL
CHLORIDE BLD-SCNC: 101 MMOL/L
CO2: 25 MMOL/L
CREAT SERPL-MCNC: 0.83 MG/DL
EGFR: 75
GLUCOSE BLD-MCNC: 98 MG/DL
IRON: 96
POTASSIUM SERPL-SCNC: 4.8 MMOL/L
SODIUM BLD-SCNC: 138 MMOL/L
TOTAL IRON BINDING CAPACITY: 311
TOTAL PROTEIN: 6.3

## 2024-03-19 LAB
FERRITIN: 192 NG/ML (ref 9–150)
MAGNESIUM: 2 MG/DL
SEDIMENTATION RATE, ERYTHROCYTE: NORMAL
VITAMIN B-12: 715

## 2024-04-01 RX ORDER — PRAVASTATIN SODIUM 20 MG
20 TABLET ORAL DAILY
Qty: 90 TABLET | Refills: 1 | Status: SHIPPED | OUTPATIENT
Start: 2024-04-01

## 2024-04-26 ENCOUNTER — TELEPHONE (OUTPATIENT)
Dept: VASCULAR SURGERY | Age: 74
End: 2024-04-26

## 2024-04-26 ENCOUNTER — TELEPHONE (OUTPATIENT)
Dept: PRIMARY CARE CLINIC | Age: 74
End: 2024-04-26

## 2024-04-26 DIAGNOSIS — I65.29 STENOSIS OF CAROTID ARTERY, UNSPECIFIED LATERALITY: Primary | ICD-10-CM

## 2024-04-26 DIAGNOSIS — I67.1 ANEURYSM OF OPHTHALMIC ARTERY: ICD-10-CM

## 2024-04-26 NOTE — TELEPHONE ENCOUNTER
PC to patient to discuss the following per Dr. Gonzalez:    MRI of the brain and carotid ultrasound reviewed.  I agree that there is nothing urgent however I do believe you should see a neurologist and possibly a vascular specialist.  I would keep the appointment with the NP at the [facility] 5/21/2024.  I will place a referral to a vascular specialist regarding the carotids.  This does not appear critical but should be followed up with a CT angiogram.    Informed patient that a referral to Dr. Tompkins was placed. Also gave her the number to their office. Results sent to Dr. Tompkins.

## 2024-04-26 NOTE — TELEPHONE ENCOUNTER
Patient notified of appointment to see Dr. Tompkins on 6-4-24 at 9:30 am, patient to bring disc to appointment.

## 2024-04-26 NOTE — TELEPHONE ENCOUNTER
----- Message from Raymond Gonzalez, DO sent at 4/26/2024  7:02 AM EDT -----  MRI of the brain and carotid ultrasound reviewed.  I agree that there is nothing urgent however I do believe you should see a neurologist and possibly a vascular specialist.  I would keep the appointment with the NP at the [facility] 5/21/2024.  I will place a referral to a vascular specialist regarding the carotids.  This does not appear critical but should be followed up with a CT angiogram.

## 2024-05-13 NOTE — PROGRESS NOTES
EXERCISE STRESS TEST    The patient exercised for 2:00 minutes on an accelarated Twan protocol achieving 4.6 METS of activity and achieved 100% MPHR. No anginal chest discomfort was noted during exercise. No arrhythmias were noted. A normal blood pressure reponse was documented. No electrocardiographic changes were noted. Conclusion: Normal electrocardiograhic exercise stress test with a clinically nonischemic response. A Duke treadmill score of 3 suggests an intermediate risk of ischemic events.     Echocardiographic results reported separately in EPIC Continue Regimen: Cabtreo at night Detail Level: Zone Otc Regimen: Cerave SA Plan: Patient advised to apply Cereve SA and then Tacrolimus

## 2024-05-21 PROBLEM — I67.1 ANEURYSM OF OPHTHALMIC ARTERY: Status: ACTIVE | Noted: 2024-05-21

## 2024-05-21 PROBLEM — I72.0 CAROTID ANEURYSM, RIGHT (HCC): Status: ACTIVE | Noted: 2024-05-21

## 2024-05-21 PROBLEM — I65.21 STENOSIS OF RIGHT CAROTID ARTERY: Status: ACTIVE | Noted: 2024-05-21

## 2024-05-21 PROBLEM — H81.03 MENIERE'S DISEASE OF BOTH EARS: Status: ACTIVE | Noted: 2024-05-21

## 2024-05-28 ENCOUNTER — TELEPHONE (OUTPATIENT)
Dept: ENT CLINIC | Age: 74
End: 2024-05-28

## 2024-05-28 NOTE — TELEPHONE ENCOUNTER
Pt is scheduled for a follow up with Dr Villegas on 07/09.  She said she is having a hard time with vertigo and wanted me to send a message asking if there is any chance she can be seen any sooner?

## 2024-05-28 NOTE — TELEPHONE ENCOUNTER
LMOM for patient instructing her what Dr. Villegas had said about completing necessary steps before her follow up with him. Left office number if patient had questions

## 2024-05-29 ENCOUNTER — TELEPHONE (OUTPATIENT)
Dept: ADMINISTRATIVE | Age: 74
End: 2024-05-29

## 2024-05-29 NOTE — TELEPHONE ENCOUNTER
Pt called to schedule her referral to Dr. Askew.  She is concerned and would like to get scheduled asap.  Please contact pt.

## 2024-05-30 ENCOUNTER — EVALUATION (OUTPATIENT)
Dept: PHYSICAL THERAPY | Age: 74
End: 2024-05-30
Payer: MEDICARE

## 2024-05-30 DIAGNOSIS — H81.03 MENIERE'S DISEASE OF BOTH EARS: Primary | ICD-10-CM

## 2024-05-30 PROCEDURE — 97163 PT EVAL HIGH COMPLEX 45 MIN: CPT | Performed by: PHYSICAL THERAPIST

## 2024-05-30 NOTE — PROGRESS NOTES
[facility] OUTPATIENT REHABILITATION  PHYSICAL THERAPY INITIAL EVALUATION         Date:  2024   Patient: Raisa Huff  : 1950  MRN: [id number]  Referring Provider: Luis M Hidalgo APRN - CNP  [address] [id number]     Medical Diagnosis:     H81.03 (ICD-10-CM) - Meniere's disease of both ears    Physician Order: Eval and Treat      SUBJECTIVE:     History: Patient reports vertigo symptoms since 2024.Having  2-3 episodes of vertigo a day the past 2 weeks.    Chief complaint:  Vertigo and loss of balance    Behavior: condition is getting worse    Pain:   Current: /10         Imaging results: No results found.    Past Medical History:  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    Scoliosis     Thoracic outlet syndrome     b/l    Thyroid disease     Ulcerative colitis (HCC)      Past Surgical History:   Procedure Laterality Date    COLONOSCOPY      has had several    COLONOSCOPY  2015    bx done, diverticulosis    COLONOSCOPY  2017    multiple biopsy    COLONOSCOPY N/A 2020    COLONOSCOPY WITH BIOPSY performed by Irvin Jha MD at [facility] ENDOSCOPY    COLONOSCOPY N/A 2022    COLONOSCOPY WITH BIOPSY performed by Irvin Jha MD at [facility] ENDOSCOPY    HERNIA REPAIR      umbilical    MOHS SURGERY      face    SKIN CANCER EXCISION      nose    TONSILLECTOMY      TUBAL LIGATION         Medications:   Current Outpatient Medications   Medication Sig Dispense Refill    diazePAM (VALIUM) 2 MG tablet Take 1 tablet by mouth every 8 hours as needed for Anxiety (vertigo) for up to 90 days. Max Daily Amount: 6 mg 30 tablet 3    pravastatin (PRAVACHOL) 20 MG tablet TAKE 1 TABLET BY MOUTH EVERY DAY 90 tablet 1    levothyroxine (SYNTHROID) 100 MCG tablet Take 1 tablet by mouth daily 90 tablet 1    ondansetron

## 2024-06-03 ENCOUNTER — TREATMENT (OUTPATIENT)
Dept: PHYSICAL THERAPY | Age: 74
End: 2024-06-03
Payer: MEDICARE

## 2024-06-03 DIAGNOSIS — H81.03 MENIERE'S DISEASE OF BOTH EARS: Primary | ICD-10-CM

## 2024-06-03 PROCEDURE — 97112 NEUROMUSCULAR REEDUCATION: CPT

## 2024-06-03 NOTE — PROGRESS NOTES
Physical Therapy Daily Treatment Note    Date: 6/3/2024  Patient Name: Raisa Huff  : 1950   MRN: [id number]  DOInjury:   DOSx:   Referring Provider: Luis M Hidalgo APRN - CNP  [address] [place] [address]     Medical Diagnosis:   Meniere's disease of both ears       Outcome Measure:          X = TO BE PERFORMED NEXT VISIT  > = PROGRESS TO THIS    S: pt reports maybe a slight improvement but continues with dizziness  O: Discussed anatomy, physiology, body mechanics, principles of loading, and progressive loading/activity.  Reviewed home exercise program extensively; written copy provided.   Time 0685-2089     Visit 2/ Repeat outcome measure at mid point and end.    Pain 0/10     ROM      Modalities      epley negative     Exercise      Nustep     TE         Head turns    Flex/ext                        Rotation                        LF                        360* 4 x 50 ft  4 x 50 ft  4 x 50 ft  4 x 50 ft Min dizzines/mod A  Mod diz  Mod A  Min diz/   mod A  Mod diz/  mod A TA      TA      TA      TA      TA         Sit/Stands Next  TA         Gait training   GT         Marching Gait   NR   Sidestepping   NR                                 A:  Tolerated well.  Pt did not resent with BPPV. But very unstable with balance.  Min-mod asst needed to prevent falling  P: Continue with rehab plan  [facility] PTA    Treatment Charges: Mins Units   Initial Evaluation     Re-Evaluation     Ther Exercise         TE     Manual Therapy     MT     Ther Activities        TA     Gait Training          GT     Neuro Re-education NR 40 3   Modalities     Non-Billable Service Time     Other     Total Time/Units 40 3

## 2024-06-04 ENCOUNTER — OFFICE VISIT (OUTPATIENT)
Dept: VASCULAR SURGERY | Age: 74
End: 2024-06-04
Payer: MEDICARE

## 2024-06-04 VITALS
DIASTOLIC BLOOD PRESSURE: 76 MMHG | RESPIRATION RATE: 16 BRPM | HEIGHT: 63 IN | SYSTOLIC BLOOD PRESSURE: 124 MMHG | BODY MASS INDEX: 24.98 KG/M2

## 2024-06-04 DIAGNOSIS — I65.21 CAROTID STENOSIS, ASYMPTOMATIC, RIGHT: Primary | ICD-10-CM

## 2024-06-04 PROCEDURE — 1123F ACP DISCUSS/DSCN MKR DOCD: CPT | Performed by: SURGERY

## 2024-06-04 PROCEDURE — 99203 OFFICE O/P NEW LOW 30 MIN: CPT | Performed by: SURGERY

## 2024-06-04 RX ORDER — CANDESARTAN CILEXETIL AND HYDROCHLOROTHIAZIDE 16; 12.5 MG/1; MG/1
1 TABLET ORAL DAILY
COMMUNITY

## 2024-06-04 RX ORDER — FLUTICASONE PROPIONATE 50 MCG
1 SPRAY, SUSPENSION (ML) NASAL DAILY
COMMUNITY

## 2024-06-04 NOTE — PROGRESS NOTES
Dyspnea:   No [x]/Yes []      Wheezing:   No [x]/Yes []  Cardiovascular:             Angina:   No [x]/Yes []      Palpitations:   No [x]/Yes []          Claudication:    No [x]/Yes []      Leg swelling:   No [x]/Yes []  Gastrointestinal:             Nausea or vomiting:  No [x]/Yes []               Abdominal pain:  No [x]/Yes []                     Intestinal bleeding: No [x]/Yes []  Musculoskeletal:             Leg pain:   No [x]/Yes []      Back pain:   No [x]/Yes []                    Weakness:   No [x]/Yes []  Neurologic:             Numbness:   No [x]/Yes []      Paralysis:   No [x]/Yes []                       Headaches:   No [x]/Yes []  Hematologic, lymphatic:   Anemia:   No [x]/Yes []              Bleeding or bruising:  No [x]/Yes []              Fevers or chills: No [x]/Yes []  Endocrine:             Temp intolerance:   No [x]/Yes []                       Polydipsia, polyuria:  No [x]/Yes []  Skin:              Rash:    No [x]/Yes []      Ulcers:   No [x]/Yes []              Abnorm pigment: No [x]/Yes []  :              Frequency/urgency:  No [x]/Yes []      Hematuria:    No [x]/Yes []                      Incontinence:    No [x]/Yes []    PHYSICAL EXAM:  Vitals:    06/04/24 0929   BP: 124/76   Resp: 16     General Appearance: alert and oriented to person, place and time, well developed and well- nourished, in no acute distress  Skin: warm and dry, no rash or erythema  Head: normocephalic and atraumatic  Eyes: extraocular eye movements intact, conjunctivae normal  ENT: external ear and ear canal normal bilaterally, nose without deformity  Pulmonary/Chest: clear to auscultation bilaterally- no wheezes, rales or rhonchi, normal air movement, no respiratory distress  Cardiovascular: normal rate, regular rhythm, normal S1 and S2, no murmurs, no carotid bruits  Abdomen: soft, non-tender, non-distended, normal bowel sounds, no masses or organomegaly  Musculoskeletal: normal range of motion, no joint swelling,

## 2024-06-06 ENCOUNTER — TREATMENT (OUTPATIENT)
Dept: PHYSICAL THERAPY | Age: 74
End: 2024-06-06
Payer: MEDICARE

## 2024-06-06 DIAGNOSIS — H81.03 MENIERE'S DISEASE OF BOTH EARS: Primary | ICD-10-CM

## 2024-06-06 PROCEDURE — 97112 NEUROMUSCULAR REEDUCATION: CPT

## 2024-06-06 NOTE — PROGRESS NOTES
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 Then right before coming to therapy started to feel like a drunk and felt could not feel a place to place feet.  Went to 2 stores before therapy with no issues.  O: Discussed anatomy, physiology, body mechanics, principles of loading, and progressive loading/activity.  Reviewed home exercise program extensively; written copy provided.   Time 4050-4283     Visit 3/ Repeat outcome measure at mid point and end.    Pain 0/10     ROM      Modalities      epley negative     Exercise      Nustep              Head turns    Flex/ext                        Rotation                        LF                        360* 4 x 50 ft  4 x 50 ft  4 x 50 ft  4 x 50 ft Min dizzines/  Mod diz    Min diz/     Mod diz/   NE               Standing balance EO 3 x 30 sec EC next? NE               Sit/Stands 14x 30 min  NE         Gait training            Marching Gait      Sidestepping            Diagonal ball chops 10x all 3 motions  NE   Walk and shop      Rolling side to side 10x To left only NE   Tug test 8 sec  NE   A:  Tolerated well.  Pt had no vertigo issues it was dizziness and LOB Feedback and cues necessary for developing neuromuscular control.  Movement education and guided movement interventions such as verbal and tactile cues used to improve performance and control.    P: Continue with rehab plan  [facility] PTA    Treatment Charges: Mins Units   Initial Evaluation     Re-Evaluation     Ther Exercise         TE     Manual Therapy     MT     Ther Activities        TA     Gait Training

## 2024-06-10 ENCOUNTER — TREATMENT (OUTPATIENT)
Dept: PHYSICAL THERAPY | Age: 74
End: 2024-06-10
Payer: MEDICARE

## 2024-06-10 DIAGNOSIS — H81.03 MENIERE'S DISEASE OF BOTH EARS: Primary | ICD-10-CM

## 2024-06-10 PROCEDURE — 97112 NEUROMUSCULAR REEDUCATION: CPT

## 2024-06-10 NOTE — PROGRESS NOTES
Physical Therapy Daily Treatment Note    Date: 6/10/2024  Patient Name: Raisa Huff  : 1950   MRN: [id number]  DOInjury:   DOSx:   Referring Provider: Luis M Hidalgo APRN - CNP  [address] [place] [address]     Medical Diagnosis:   Meniere's disease of both ears       Outcome Measure:          X = TO BE PERFORMED NEXT VISIT  > = PROGRESS TO THIS    S:  Pt. Stated that she had an episode of dizziness yesterday. Woke up this morning and felt good but had symptoms reoccur after she was reorganizing her closet later in the day.   O: positive for meniere dx Discussed anatomy, physiology, body mechanics, principles of loading, and progressive loading/activity.  Reviewed home exercise program extensively; written copy provided.   Time 6172-2077     Visit 4/ Repeat outcome measure at mid point and end.    Pain 0/10     ROM      Modalities      epley negative     Exercise      Nustep              Head turns    Flex/ext                        Rotation                        LF                        360* 4 x 50 ft  4 x 50 ft  4 x 50 ft  4 x 50 ft Min dizzines/  Mod diz    Min diz/     Mod diz/   NE               Standing balance on airex  EO 3 x 30 sec and EC 3 x 30 sec      NE               Sit/Stands  NE         Gait training      Gait with Eyes closed  2 x 50 ft  Mod LOB   SBA x 2     Marching Gait      Sidestepping      Obstacle course with 8\" stairs and 2 cone touch  5 x clockwise and 5 x counterclockwise  Clockwise inflicted more dizziness     Diagonal ball chops 10x all 3 motions  NE   Walk and shop 200 ft      Rolling side to side HEP NE   Tug test  NE   A:  Tolerated well.  Pt had no vertigo issues it was dizziness and LOB. Feedback and cues necessary for developing neuromuscular control.  Movement education and guided movement interventions such as verbal and tactile cues used to improve performance and control. Pt. Appears to have the most difficulty with performing exercises that involve

## 2024-06-12 ENCOUNTER — TREATMENT (OUTPATIENT)
Dept: PHYSICAL THERAPY | Age: 74
End: 2024-06-12
Payer: MEDICARE

## 2024-06-12 DIAGNOSIS — H81.03 MENIERE'S DISEASE OF BOTH EARS: Primary | ICD-10-CM

## 2024-06-12 PROCEDURE — 97112 NEUROMUSCULAR REEDUCATION: CPT

## 2024-06-12 NOTE — PROGRESS NOTES
Physical Therapy Daily Treatment Note    Date: 2024  Patient Name: Raisa Huff  : 1950   MRN: [id number]  DOInjury:   DOSx:   Referring Provider: Luis M Hidalgo APRN - CNP  [address] [place] [address]     Medical Diagnosis:   Meniere's disease of both ears       Outcome Measure:          X = TO BE PERFORMED NEXT VISIT  > = PROGRESS TO THIS    S:  pt reports doing a lot of things at home stilling having problems but improving.     O: positive for meniere dx Discussed anatomy, physiology, body mechanics, principles of loading, and progressive loading/activity.  Reviewed home exercise program extensively; written copy provided.   Time 6841-0572     Visit 5/ Repeat outcome measure at mid point and end.    Pain 0/10     ROM      Modalities      epley negative     Exercise      Nustep              Head turns    Flex/ext                        Rotation                        LF                        360* 4 x 50 ft  4 x 50 ft  4 x 50 ft  4 x 50 ft Min dizzines/  Mod diz    Min diz/     Mod diz/   NE               Standing balance on airex   EC 3 x 30 sec      NE               Sit/Stands  NE   Gait tandem:  2 x 50 ft      Gait BWD eyes closed:  2 x 50 ft      Gait with Eyes closed  2 x 50 ft  Mod LOB   SBA-min A x 2     Marching Gait      Sidestepping      Obstacle course with 8\" stairs and 5 cone touch at different heights  5 x clockwise and 5 x counterclockwise  Clockwise inflicted more dizziness     Diagonal ball chops 10x all 3 motions  NE   Walk and shop 200 ft      Rolling side to side HEP NE   Tug test  NE   A:  Tolerated well.  Worked having pt go backwards, with eyes closed.  Pt had no vertigo issues it was dizziness and LOB. Feedback and cues necessary for developing neuromuscular control.  Movement education and guided movement interventions such as verbal and tactile cues used to improve performance and control. Pt. Appears to have the most difficulty with performing exercises that

## 2024-06-17 ENCOUNTER — TELEPHONE (OUTPATIENT)
Dept: PRIMARY CARE CLINIC | Age: 74
End: 2024-06-17

## 2024-06-17 ENCOUNTER — TREATMENT (OUTPATIENT)
Dept: PHYSICAL THERAPY | Age: 74
End: 2024-06-17
Payer: MEDICARE

## 2024-06-17 DIAGNOSIS — I72.0 CAROTID ARTERY ANEURYSM (HCC): Primary | ICD-10-CM

## 2024-06-17 DIAGNOSIS — H81.03 MENIERE'S DISEASE OF BOTH EARS: Primary | ICD-10-CM

## 2024-06-17 PROCEDURE — 97112 NEUROMUSCULAR REEDUCATION: CPT

## 2024-06-17 NOTE — TELEPHONE ENCOUNTER
PC from pt, stated she saw Dr. Adam Askew for intracranial aneurysm. Pt was told Dr. Askew would be able to handle aneurysm, but pt stated she would be more comfortable going to [facility] for her aneurysm    Asked if a referral can be placed to [facility].    Please advsise

## 2024-06-17 NOTE — PROGRESS NOTES
Physical Therapy Daily Treatment Note    Date: 2024  Patient Name: Raisa Huff  : 1950   MRN: [id number]  DOInjury:   DOSx:   Referring Provider: Luis M Hidalgo, JARAD - CNP  [address] [place] [address]     Medical Diagnosis:   Meniere's disease of both ears       Outcome Measure:          X = TO BE PERFORMED NEXT VISIT  > = PROGRESS TO THIS    S:  pt reports feeling more dizzy than usual, possibly due to stress or weather changes.  wants to perform surgery on her brain due to a aneurysm.  Per pt therapy will not hurt her.  O: positive for meniere dx Discussed anatomy, physiology, body mechanics, principles of loading, and progressive loading/activity.  Reviewed home exercise program extensively; written copy provided.   Time 3304-5470     Visit 6/ Repeat outcome measure at mid point and end.    Pain 0/10     ROM      Modalities      epley     Exercise      Nustep              Head turns    Flex/ext                        Rotation                        LF                        360* 4 x 50 ft  4 x 50 ft  4 x 50 ft  4 x 50 ft Min dizzines/  Mod diz    Min diz/     Mod diz/   NE               Standing balance on airex  EO 3 x 30 sec with cervical flexion/extension and EC 3 x 30 sec with cervical flexion/extension     NE               Sit/Stands  NE   Gait tandem:  2 x 50 ft      Gait BWD eyes closed:  2 x 50 ft      Gait with Eyes closed  2 x 50 ft  Mod LOB   SBA-min A x 2     Marching Gait      Sidestepping      Obstacle course with 8\" stairs and 5 cone touch at different heights  Clockwise inflicted more dizziness     Diagonal ball chops 10x all 3 motions  NE   Walk and shop     Rolling side to side HEP NE   Tug test  NE   A:  Tolerated well.  Worked having pt go backwards, with eyes closed.  Pt had no vertigo issues it was dizziness and LOB. Feedback and cues necessary for developing neuromuscular control.  Movement education and guided movement interventions such as verbal and

## 2024-06-18 ENCOUNTER — TELEPHONE (OUTPATIENT)
Dept: INTERVENTIONAL RADIOLOGY/VASCULAR | Age: 74
End: 2024-06-18

## 2024-06-18 NOTE — TELEPHONE ENCOUNTER
I just called Raisa Huff to schedule her diagnostic cerebral angio. She is going to go to [place] and does not wish to schedule at this time. She will be in touch with Missael if she wishes to reschedule.   Paige

## 2024-06-18 NOTE — TELEPHONE ENCOUNTER
PC to pt, notifying referral placed to Dr. Jonathan Ny @ [facility].    Pt voiced understanding.

## 2024-06-19 ENCOUNTER — TELEPHONE (OUTPATIENT)
Dept: PRIMARY CARE CLINIC | Age: 74
End: 2024-06-19

## 2024-06-19 ENCOUNTER — TREATMENT (OUTPATIENT)
Dept: PHYSICAL THERAPY | Age: 74
End: 2024-06-19
Payer: MEDICARE

## 2024-06-19 DIAGNOSIS — H81.03 MENIERE'S DISEASE OF BOTH EARS: Primary | ICD-10-CM

## 2024-06-19 DIAGNOSIS — I67.1 INTRACRANIAL ANEURYSM: Primary | ICD-10-CM

## 2024-06-19 PROCEDURE — 97112 NEUROMUSCULAR REEDUCATION: CPT

## 2024-06-19 NOTE — TELEPHONE ENCOUNTER
----- Message from Raymond Gonzalez DO sent at 6/19/2024  6:02 AM EDT -----  Patient may need a different referral.  I initially referred her to Dr. Jonathan Valdez.  She probably needs a referral to neurosurgery.

## 2024-06-19 NOTE — PROGRESS NOTES
education and guided movement interventions such as verbal and tactile cues used to improve performance and control. Pt. Appears to have the most difficulty with performing exercises that involve closing her eyes. During gait with eyes closed, the pt. No longer has a tendency to stray towards the left like she had previously. Due to no improvement, pt will be put on hold depending on what is found with CC and the surgery.  [facility] recommend her to follow up there too.     P: pt. Was put on hold until visit with meniere's  specialist.   [facility], PTA and Inocente Fuentes, SPTA     Treatment Charges: Mins Units   Initial Evaluation     Re-Evaluation     Ther Exercise         TE     Manual Therapy     MT     Ther Activities        TA     Gait Training          GT     Neuro Re-education NR 41 3   Modalities     Non-Billable Service Time     Other     Total Time/Units 41 3

## 2024-06-25 ENCOUNTER — TELEPHONE (OUTPATIENT)
Dept: PHYSICAL THERAPY | Age: 74
End: 2024-06-25

## 2024-06-30 SDOH — HEALTH STABILITY: PHYSICAL HEALTH: ON AVERAGE, HOW MANY DAYS PER WEEK DO YOU ENGAGE IN MODERATE TO STRENUOUS EXERCISE (LIKE A BRISK WALK)?: 4 DAYS

## 2024-06-30 SDOH — ECONOMIC STABILITY: FOOD INSECURITY: WITHIN THE PAST 12 MONTHS, THE FOOD YOU BOUGHT JUST DIDN'T LAST AND YOU DIDN'T HAVE MONEY TO GET MORE.: NEVER TRUE

## 2024-06-30 SDOH — HEALTH STABILITY: PHYSICAL HEALTH: ON AVERAGE, HOW MANY MINUTES DO YOU ENGAGE IN EXERCISE AT THIS LEVEL?: 30 MIN

## 2024-06-30 SDOH — ECONOMIC STABILITY: INCOME INSECURITY: HOW HARD IS IT FOR YOU TO PAY FOR THE VERY BASICS LIKE FOOD, HOUSING, MEDICAL CARE, AND HEATING?: NOT HARD AT ALL

## 2024-06-30 SDOH — ECONOMIC STABILITY: FOOD INSECURITY: WITHIN THE PAST 12 MONTHS, YOU WORRIED THAT YOUR FOOD WOULD RUN OUT BEFORE YOU GOT MONEY TO BUY MORE.: NEVER TRUE

## 2024-06-30 ASSESSMENT — LIFESTYLE VARIABLES
HOW MANY STANDARD DRINKS CONTAINING ALCOHOL DO YOU HAVE ON A TYPICAL DAY: 1 OR 2
HOW OFTEN DO YOU HAVE A DRINK CONTAINING ALCOHOL: 2-4 TIMES A MONTH
HOW OFTEN DO YOU HAVE A DRINK CONTAINING ALCOHOL: 3
HOW OFTEN DO YOU HAVE SIX OR MORE DRINKS ON ONE OCCASION: 1
HOW MANY STANDARD DRINKS CONTAINING ALCOHOL DO YOU HAVE ON A TYPICAL DAY: 1

## 2024-06-30 ASSESSMENT — PATIENT HEALTH QUESTIONNAIRE - PHQ9
SUM OF ALL RESPONSES TO PHQ9 QUESTIONS 1 & 2: 0
SUM OF ALL RESPONSES TO PHQ QUESTIONS 1-9: 0
SUM OF ALL RESPONSES TO PHQ QUESTIONS 1-9: 0
1. LITTLE INTEREST OR PLEASURE IN DOING THINGS: NOT AT ALL
SUM OF ALL RESPONSES TO PHQ QUESTIONS 1-9: 0
2. FEELING DOWN, DEPRESSED OR HOPELESS: NOT AT ALL
SUM OF ALL RESPONSES TO PHQ QUESTIONS 1-9: 0

## 2024-07-01 ENCOUNTER — OFFICE VISIT (OUTPATIENT)
Dept: PRIMARY CARE CLINIC | Age: 74
End: 2024-07-01

## 2024-07-01 VITALS
WEIGHT: 141 LBS | SYSTOLIC BLOOD PRESSURE: 120 MMHG | HEART RATE: 96 BPM | DIASTOLIC BLOOD PRESSURE: 80 MMHG | TEMPERATURE: 97.4 F | OXYGEN SATURATION: 98 % | HEIGHT: 63 IN | BODY MASS INDEX: 24.98 KG/M2

## 2024-07-01 DIAGNOSIS — E78.2 MIXED HYPERLIPIDEMIA: ICD-10-CM

## 2024-07-01 DIAGNOSIS — H81.03 MENIERE'S DISEASE OF BOTH EARS: ICD-10-CM

## 2024-07-01 DIAGNOSIS — E03.9 HYPOTHYROIDISM, UNSPECIFIED TYPE: ICD-10-CM

## 2024-07-01 DIAGNOSIS — I65.21 CAROTID STENOSIS, ASYMPTOMATIC, RIGHT: ICD-10-CM

## 2024-07-01 DIAGNOSIS — Z00.00 MEDICARE ANNUAL WELLNESS VISIT, SUBSEQUENT: Primary | ICD-10-CM

## 2024-07-01 DIAGNOSIS — I67.1 ANEURYSM OF OPHTHALMIC ARTERY: ICD-10-CM

## 2024-07-01 DIAGNOSIS — R00.2 PALPITATIONS: ICD-10-CM

## 2024-07-01 PROBLEM — I72.0 CAROTID ANEURYSM, RIGHT (HCC): Status: RESOLVED | Noted: 2024-05-21 | Resolved: 2024-07-01

## 2024-07-01 RX ORDER — PRAVASTATIN SODIUM 20 MG
20 TABLET ORAL DAILY
Qty: 90 TABLET | Refills: 1 | Status: SHIPPED | OUTPATIENT
Start: 2024-07-01

## 2024-07-01 RX ORDER — LEVOTHYROXINE SODIUM 0.1 MG/1
100 TABLET ORAL DAILY
Qty: 90 TABLET | Refills: 1 | Status: SHIPPED | OUTPATIENT
Start: 2024-07-01

## 2024-07-01 NOTE — PROGRESS NOTES
Medicare Annual Wellness Visit    Raisa Huff is here for Medicare AWV (Subsequent Medicare AWE Last labs obtained 3/18/24 Appt CCT 8/15/24)    Assessment & Plan   Medicare annual wellness visit, subsequent  -     CBC; Future  -     Comprehensive Metabolic Panel, Fasting; Future  -     Lipid Panel; Future  Palpitations  -     Cardiac holter monitor (<= 48 hours); Future  -     TSH; Future  Aneurysm of ophthalmic artery  Meniere's disease of both ears  Carotid stenosis, asymptomatic, right  -     Lipid Panel; Future  Hypothyroidism, unspecified type  -     TSH; Future  -     levothyroxine (SYNTHROID) 100 MCG tablet; Take 1 tablet by mouth daily, Disp-90 tablet, R-1Normal  Mixed hyperlipidemia  -     pravastatin (PRAVACHOL) 20 MG tablet; Take 1 tablet by mouth daily, Disp-90 tablet, R-1Normal  Recommendations for Preventive Services Due: see orders and patient instructions/AVS.  Recommended screening schedule for the next 5-10 years is provided to the patient in written form: see Patient Instructions/AVS.     Return for Medicare Annual Wellness Visit in 1 year.     Subjective   The following acute and/or chronic problems were also addressed today:    Patient is complaining of some palpitations.  She denies any lightheadedness (history of Ménière's disease), chest discomfort, SILVERMAN.    Patient's complete Health Risk Assessment and screening values have been reviewed and are found in Flowsheets. The following problems were reviewed today and where indicated follow up appointments were made and/or referrals ordered.    Positive Risk Factor Screenings with Interventions:                   Vision Screen:  Do you have difficulty driving, watching TV, or doing any of your daily activities because of your eyesight?: (!) Yes  Have you had an eye exam within the past year?: Yes  No results found.    Interventions:    Appt scheduled with Dr. Ramirez     Safety:  Do you have non-slip mats or non-slip surfaces or shower bars or

## 2024-07-01 NOTE — PATIENT INSTRUCTIONS
medicines you take.  How can you care for yourself at home?  Diet    Use less salt when you cook and eat. This helps lower your blood pressure. Taste food before salting. Add only a little salt when you think you need it. With time, your taste buds will adjust to less salt.     Eat fewer snack items, fast foods, canned soups, and other high-salt, high-fat, processed foods.     Read food labels and try to avoid saturated and trans fats. They increase your risk of heart disease by raising cholesterol levels.     Limit the amount of solid fat--butter, margarine, and shortening--you eat. Use olive, peanut, or canola oil when you cook. Bake, broil, and steam foods instead of frying them.     Eat a variety of fruit and vegetables every day. Dark green, deep orange, red, or yellow fruits and vegetables are especially good for you. Examples include spinach, carrots, peaches, and berries.     Foods high in fiber can reduce your cholesterol and provide important vitamins and minerals. High-fiber foods include whole-grain cereals and breads, oatmeal, beans, brown rice, citrus fruits, and apples.     Eat lean proteins. Heart-healthy proteins include seafood, lean meats and poultry, eggs, beans, peas, nuts, seeds, and soy products.     Limit drinks and foods with added sugar. These include candy, desserts, and soda pop.   Heart-healthy lifestyle    If your doctor recommends it, get more exercise. For many people, walking is a good choice. Or you may want to swim, bike, or do other activities. Bit by bit, increase the time you're active every day. Try for at least 30 minutes on most days of the week.     Try to quit or cut back on using tobacco and other nicotine products. This includes smoking and vaping. If you need help quitting, talk to your doctor about stop-smoking programs and medicines. These can increase your chances of quitting for good. Quitting is one of the most important things you can do to protect your heart. It

## 2024-07-02 ENCOUNTER — TELEPHONE (OUTPATIENT)
Dept: PRIMARY CARE CLINIC | Age: 74
End: 2024-07-02

## 2024-07-02 NOTE — TELEPHONE ENCOUNTER
PC to patient to inform 24 Hour Holter Monitor scheduled for July 15, 2024 @ 10:30 am with a 10 am arrival to [facility] in [facility]    Patient acknowledged understanding

## 2024-07-09 ENCOUNTER — OFFICE VISIT (OUTPATIENT)
Dept: ENT CLINIC | Age: 74
End: 2024-07-09

## 2024-07-09 ENCOUNTER — PROCEDURE VISIT (OUTPATIENT)
Dept: AUDIOLOGY | Age: 74
End: 2024-07-09
Payer: MEDICARE

## 2024-07-09 VITALS
SYSTOLIC BLOOD PRESSURE: 115 MMHG | DIASTOLIC BLOOD PRESSURE: 74 MMHG | HEART RATE: 89 BPM | HEIGHT: 63 IN | WEIGHT: 140.4 LBS | BODY MASS INDEX: 24.88 KG/M2

## 2024-07-09 DIAGNOSIS — H90.A21 SENSORINEURAL HEARING LOSS (SNHL) OF RIGHT EAR WITH RESTRICTED HEARING OF LEFT EAR: ICD-10-CM

## 2024-07-09 DIAGNOSIS — H93.11 TINNITUS OF RIGHT EAR: ICD-10-CM

## 2024-07-09 DIAGNOSIS — H81.01 MENIERE'S DISEASE OF RIGHT EAR: Primary | ICD-10-CM

## 2024-07-09 DIAGNOSIS — H81.01 MENIERES DISEASE, RIGHT: Primary | ICD-10-CM

## 2024-07-09 DIAGNOSIS — R42 VERTIGO: ICD-10-CM

## 2024-07-09 DIAGNOSIS — H90.A21 SENSORINEURAL HEARING LOSS, UNILATERAL, RIGHT EAR, WITH RESTRICTED HEARING ON THE CONTRALATERAL SIDE: ICD-10-CM

## 2024-07-09 PROCEDURE — 92567 TYMPANOMETRY: CPT | Performed by: AUDIOLOGIST

## 2024-07-09 PROCEDURE — 92557 COMPREHENSIVE HEARING TEST: CPT | Performed by: AUDIOLOGIST

## 2024-07-09 RX ORDER — HYDROCHLOROTHIAZIDE 12.5 MG/1
12.5 CAPSULE, GELATIN COATED ORAL EVERY MORNING
Qty: 30 CAPSULE | Refills: 3 | Status: SHIPPED | OUTPATIENT
Start: 2024-07-09

## 2024-07-09 ASSESSMENT — ENCOUNTER SYMPTOMS
COUGH: 0
SHORTNESS OF BREATH: 0
RHINORRHEA: 0
SORE THROAT: 0
VOMITING: 0
VOICE CHANGE: 0
TROUBLE SWALLOWING: 0

## 2024-07-09 NOTE — PROGRESS NOTES
[facility]  JULIO BaeO. Ms.Ed        Patient Name:  Raisa Huff  :  1950     CHIEF C/O:    Chief Complaint   Patient presents with    Follow-up     1 yr menieres       HISTORY OBTAINED FROM:  patient    HISTORY OF PRESENT ILLNESS:       Raisa is a 74 y.o. year old female, here today for follow up of:       Here for follow up for known history of meniere's with repeat audiogram. Patient reports severe episodes of vertigo over the winter. Reports having dizzy spells now once per week for a less than hour. Reports last severe episode was in April. Reports right ear feels \"light/airy\". She feels her hearing and tinnitus is worse. She tried HCTZ which did not help. Reports following a low salt diet. Reports issues hearing the TV. She has a hearing aid primarily for tinnitus which she is not wearing much.           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    Scoliosis     Thoracic outlet syndrome     b/l    Thyroid disease     Ulcerative colitis (HCC)      Past Surgical History:   Procedure Laterality Date    COLONOSCOPY      has had several    COLONOSCOPY  2015    bx done, diverticulosis    COLONOSCOPY  2017    multiple biopsy    COLONOSCOPY N/A 2020    COLONOSCOPY WITH BIOPSY performed by Irvin Jha MD at [facility] ENDOSCOPY    COLONOSCOPY N/A 2022    COLONOSCOPY WITH BIOPSY performed by Irvin Jha MD at [facility] ENDOSCOPY    HERNIA REPAIR      umbilical    MOHS SURGERY      face    SKIN CANCER EXCISION      nose    TONSILLECTOMY      TUBAL LIGATION         Current Outpatient Medications:     levothyroxine (SYNTHROID) 100 MCG tablet, Take 1 tablet by mouth daily, Disp: 90 tablet, Rfl: 1    pravastatin (PRAVACHOL) 20 MG tablet, Take 1 tablet by mouth daily, Disp: 90 tablet, Rfl: 1    fluticasone (FLONASE) 50 MCG/ACT nasal spray, 1

## 2024-07-10 NOTE — PROGRESS NOTES
This patient was referred for audiometric and tympanometric testing by Dr. Villegas.  Patient is being seen for her yearly ENT/Audiology consult, for Meniere's disease, unilateral hearing loss, vertigo and tinnitus, right ear.    Audiometry using pure tone air and bone conduction testing revealed a normal-to-mild hearing loss, left ear and a mild-to-moderate sensorineural hearing loss, through the frequency range, right ear.  . Reliability was good. Speech reception thresholds were in good agreement with the pure tone averages, bilaterally.  Speech discrimination scores were 68%, right ear at 80dBHL and 96%, left ear at 55dBHL.    Tympanometry revealed normal middle ear peak pressure and reduced compliance, bilaterally.  Ipsilateral acoustic reflexes were absent, bilaterally at 1000Hz.    The results were reviewed with the patient and ordering provider.     Recommendations for follow up will be made pending physician consult.    Edward Lai CCC/TONIE  Audiologist  [id number]  NPI#:  [id number]      Electronically signed by Tosha Mooney on 7/10/2024 at 9:07 AM

## 2024-07-15 ENCOUNTER — HOSPITAL ENCOUNTER (OUTPATIENT)
Age: 74
Discharge: HOME | End: 2024-07-17
Attending: FAMILY MEDICINE
Payer: MEDICARE

## 2024-07-15 DIAGNOSIS — R00.2 PALPITATIONS: ICD-10-CM

## 2024-07-15 PROCEDURE — 93225 XTRNL ECG REC<48 HRS REC: CPT

## 2024-07-25 NOTE — H&P
[place] [id number]                           HISTORY & PHYSICAL      PATIENT NAME: NITHIN ELDER             : 1950  MED REC NO: [id number]                        ROOM:   ACCOUNT NO: [id number]                       ADMIT DATE: 2024  PROVIDER: Shalom Alatorre MD      CHIEF COMPLAINT:  Screening.    HISTORY OF PRESENT ILLNESS:  The patient is a 74-year-old for screening colonoscopy, history of ulcerative colitis.    HABITS:  Denies.    ALLERGIES:  SULFA.      CURRENT MEDICATIONS:  Lialda, multivitamin, hydrochlorothiazide, folic acid.    PAST MEDICAL HISTORY:  Ulcerative colitis.    PAST SURGICAL HISTORY:  Tonsils.    REVIEW OF SYSTEMS:  Noncontributory for this particular problem.    FAMILY HISTORY:  Noncontributory for this particular problem.    PHYSICAL EXAMINATION:  GENERAL:  Alert, oriented.  VITAL SIGNS:  /80, pulse 72, respirations 16, temperature 97.1.  ORAL CAVITY:  Negative.  NECK:  Negative.  Peripheral lymph nodes not palpable.   LUNGS:  Clear to auscultation.  BREASTS:  Deferred.   HEART:  Regular.  ABDOMEN:  Soft.  No palpable masses.  RECTAL:  Negative.  GENITALIA:  Deferred.  EXTREMITIES:  Negative.   NEUROLOGIC:  Oriented in time and place.  No gross deficit.    IMPRESSION:  Screening colonoscopy.  History of ulcerative colitis.    PLAN:  Colonoscopy.          SHALOM ALATORRE MD      D:  2024 11:55:46     T:  2024 12:12:40     CAR/"[employer]"S  Job #:  [id number]     Doc#:  [id number]

## 2024-08-26 LAB
ALBUMIN: 4.4 G/DL
ALP BLD-CCNC: 60 U/L
ALT SERPL-CCNC: 18 U/L
AST SERPL-CCNC: 26 U/L
BASOPHILS ABSOLUTE: ABNORMAL
BASOPHILS RELATIVE PERCENT: ABNORMAL
BILIRUB SERPL-MCNC: 0.8 MG/DL (ref 0.1–1.4)
BUN BLDV-MCNC: 12 MG/DL
CALCIUM SERPL-MCNC: 9.9 MG/DL
CHLORIDE BLD-SCNC: 97 MMOL/L
CHOLESTEROL, TOTAL: 187 MG/DL
CHOLESTEROL/HDL RATIO: 2.63
CO2: 26 MMOL/L
CREAT SERPL-MCNC: 0.93 MG/DL
EOSINOPHILS ABSOLUTE: ABNORMAL
EOSINOPHILS RELATIVE PERCENT: ABNORMAL
GLUCOSE FASTING: 92 MG/DL
HCT VFR BLD CALC: 35.7 % (ref 36–46)
HDLC SERPL-MCNC: 71 MG/DL (ref 35–70)
HEMOGLOBIN: 12.2 G/DL (ref 12–16)
LDL CHOLESTEROL: 94
LYMPHOCYTES ABSOLUTE: ABNORMAL
LYMPHOCYTES RELATIVE PERCENT: ABNORMAL
MCH RBC QN AUTO: 31.9 PG
MCHC RBC AUTO-ENTMCNC: 34.2 G/DL
MCV RBC AUTO: 93.2 FL
MONOCYTES ABSOLUTE: ABNORMAL
MONOCYTES RELATIVE PERCENT: ABNORMAL
NEUTROPHILS ABSOLUTE: ABNORMAL
NEUTROPHILS RELATIVE PERCENT: ABNORMAL
NONHDLC SERPL-MCNC: 116 MG/DL
PLATELET # BLD: 168 K/ΜL
PMV BLD AUTO: 11.2 FL
POTASSIUM SERPL-SCNC: 4.3 MMOL/L
RBC # BLD: 3.83 10^6/ΜL
SODIUM BLD-SCNC: 134 MMOL/L
TOTAL PROTEIN: 7 G/DL (ref 6.4–8.2)
TRIGL SERPL-MCNC: 110 MG/DL
TSH SERPL DL<=0.05 MIU/L-ACNC: 0.68 UIU/ML
VLDLC SERPL CALC-MCNC: 22 MG/DL
WBC # BLD: 3.88 10^3/ML

## 2024-08-28 RX ORDER — LORATADINE 10 MG/1
10 TABLET ORAL DAILY PRN
COMMUNITY

## 2024-08-28 NOTE — PROGRESS NOTES
[facility]                                                                                                                    PRE OP INSTRUCTIONS FOR  Raisa Huff        Date: 8/28/2024    Date of surgery: 8/29/24   Arrival Time: Hospital will call you between 5pm and 7pm with your final arrival time for surgery. Go to front of hospital and check in at information desk.    Nothing by mouth (NPO) as instructed. May have clear liquids up to 2 hours prior to surgery. Nothing solid after midnight. Examples: water, apple juice, black coffee, plain tea    Take the following medications with a small sip of water on the morning of Surgery: Levothyroxine     Diabetics may take half the evening dose of insulin but none after midnight.  If you feel symptomatic or have low blood sugar morning of surgery drink 1-2 ounces of apple juice only. If you take a weekly insulin injection _______________, stop 7 days prior to surgery. If you take _______________, stop 3-4 days prior to surgery.    Aspirin, Ibuprofen, Advil, Naproxen, other Anti-inflammatory products should be stopped before surgery as directed by your surgeon, cardiologist, or primary care Doctor. Herbal supplements and Vitamin E should be stopped five days prior.  May take Tylenol unless instructed otherwise by your surgeon.    Check with your Doctor regarding stopping Plavix, Coumadin, Lovenox, Eliquis, Effient, or other blood thinners such as, pradaxa, lixiana, xaralto and savaysa.    Do not smoke, vape, or use illicit drugs and do not drink any alcoholic beverages 24 hours prior to surgery.    You may brush your teeth the morning of surgery.      You MUST make arrangements for a responsible adult, 18 and over, to take you home after your surgery. You will not be allowed to leave alone or drive yourself home.  You will need someone stay with you the first 24 hrs. Your surgery will be cancelled if you do not have a ride home or someone to

## 2024-08-29 ENCOUNTER — HOSPITAL ENCOUNTER (OUTPATIENT)
Age: 74
Setting detail: OUTPATIENT SURGERY
Discharge: HOME | End: 2024-08-29
Attending: INTERNAL MEDICINE | Admitting: INTERNAL MEDICINE
Payer: MEDICARE

## 2024-08-29 ENCOUNTER — ANESTHESIA (OUTPATIENT)
Dept: ENDOSCOPY | Age: 74
End: 2024-08-29
Payer: MEDICARE

## 2024-08-29 ENCOUNTER — ANESTHESIA EVENT (OUTPATIENT)
Dept: ENDOSCOPY | Age: 74
End: 2024-08-29
Payer: MEDICARE

## 2024-08-29 VITALS
TEMPERATURE: 97.4 F | WEIGHT: 135 LBS | SYSTOLIC BLOOD PRESSURE: 130 MMHG | BODY MASS INDEX: 23.92 KG/M2 | RESPIRATION RATE: 16 BRPM | HEIGHT: 63 IN | HEART RATE: 75 BPM | OXYGEN SATURATION: 98 % | DIASTOLIC BLOOD PRESSURE: 65 MMHG

## 2024-08-29 DIAGNOSIS — Z12.11 SPECIAL SCREENING FOR MALIGNANT NEOPLASMS, COLON: ICD-10-CM

## 2024-08-29 PROCEDURE — 2709999900 HC NON-CHARGEABLE SUPPLY: Performed by: INTERNAL MEDICINE

## 2024-08-29 PROCEDURE — 7100000010 HC PHASE II RECOVERY - FIRST 15 MIN: Performed by: INTERNAL MEDICINE

## 2024-08-29 PROCEDURE — 2500000003 HC RX 250 WO HCPCS: Performed by: NURSE ANESTHETIST, CERTIFIED REGISTERED

## 2024-08-29 PROCEDURE — 3700000001 HC ADD 15 MINUTES (ANESTHESIA): Performed by: INTERNAL MEDICINE

## 2024-08-29 PROCEDURE — 3700000000 HC ANESTHESIA ATTENDED CARE: Performed by: INTERNAL MEDICINE

## 2024-08-29 PROCEDURE — 88305 TISSUE EXAM BY PATHOLOGIST: CPT

## 2024-08-29 PROCEDURE — 7100000011 HC PHASE II RECOVERY - ADDTL 15 MIN: Performed by: INTERNAL MEDICINE

## 2024-08-29 PROCEDURE — 6360000002 HC RX W HCPCS: Performed by: NURSE ANESTHETIST, CERTIFIED REGISTERED

## 2024-08-29 PROCEDURE — 3609010300 HC COLONOSCOPY W/BIOPSY SINGLE/MULTIPLE: Performed by: INTERNAL MEDICINE

## 2024-08-29 PROCEDURE — 2580000003 HC RX 258: Performed by: NURSE ANESTHETIST, CERTIFIED REGISTERED

## 2024-08-29 PROCEDURE — 2580000003 HC RX 258: Performed by: ANESTHESIOLOGY

## 2024-08-29 RX ORDER — FENTANYL CITRATE 50 UG/ML
INJECTION, SOLUTION INTRAMUSCULAR; INTRAVENOUS PRN
Status: DISCONTINUED | OUTPATIENT
Start: 2024-08-29 | End: 2024-08-29 | Stop reason: SDUPTHER

## 2024-08-29 RX ORDER — SODIUM CHLORIDE, SODIUM LACTATE, POTASSIUM CHLORIDE, CALCIUM CHLORIDE 600; 310; 30; 20 MG/100ML; MG/100ML; MG/100ML; MG/100ML
INJECTION, SOLUTION INTRAVENOUS CONTINUOUS PRN
Status: DISCONTINUED | OUTPATIENT
Start: 2024-08-29 | End: 2024-08-29 | Stop reason: SDUPTHER

## 2024-08-29 RX ORDER — SODIUM CHLORIDE, SODIUM LACTATE, POTASSIUM CHLORIDE, CALCIUM CHLORIDE 600; 310; 30; 20 MG/100ML; MG/100ML; MG/100ML; MG/100ML
INJECTION, SOLUTION INTRAVENOUS CONTINUOUS
Status: DISCONTINUED | OUTPATIENT
Start: 2024-08-29 | End: 2024-08-29 | Stop reason: HOSPADM

## 2024-08-29 RX ORDER — MIDAZOLAM HYDROCHLORIDE 1 MG/ML
INJECTION INTRAMUSCULAR; INTRAVENOUS PRN
Status: DISCONTINUED | OUTPATIENT
Start: 2024-08-29 | End: 2024-08-29 | Stop reason: SDUPTHER

## 2024-08-29 RX ORDER — LIDOCAINE HYDROCHLORIDE 20 MG/ML
INJECTION, SOLUTION INFILTRATION; PERINEURAL PRN
Status: DISCONTINUED | OUTPATIENT
Start: 2024-08-29 | End: 2024-08-29 | Stop reason: SDUPTHER

## 2024-08-29 RX ORDER — PROPOFOL 10 MG/ML
INJECTION, EMULSION INTRAVENOUS PRN
Status: DISCONTINUED | OUTPATIENT
Start: 2024-08-29 | End: 2024-08-29 | Stop reason: SDUPTHER

## 2024-08-29 RX ADMIN — SODIUM CHLORIDE, POTASSIUM CHLORIDE, SODIUM LACTATE AND CALCIUM CHLORIDE: 600; 310; 30; 20 INJECTION, SOLUTION INTRAVENOUS at 07:26

## 2024-08-29 RX ADMIN — FENTANYL CITRATE 50 MCG: 50 INJECTION, SOLUTION INTRAMUSCULAR; INTRAVENOUS at 07:10

## 2024-08-29 RX ADMIN — SODIUM CHLORIDE, POTASSIUM CHLORIDE, SODIUM LACTATE AND CALCIUM CHLORIDE: 600; 310; 30; 20 INJECTION, SOLUTION INTRAVENOUS at 06:56

## 2024-08-29 RX ADMIN — PROPOFOL 100 MCG/KG/MIN: 10 INJECTION, EMULSION INTRAVENOUS at 07:11

## 2024-08-29 RX ADMIN — SODIUM CHLORIDE, POTASSIUM CHLORIDE, SODIUM LACTATE AND CALCIUM CHLORIDE: 600; 310; 30; 20 INJECTION, SOLUTION INTRAVENOUS at 06:40

## 2024-08-29 RX ADMIN — MIDAZOLAM 1 MG: 1 INJECTION INTRAMUSCULAR; INTRAVENOUS at 07:10

## 2024-08-29 RX ADMIN — PROPOFOL 80 MG: 10 INJECTION, EMULSION INTRAVENOUS at 07:10

## 2024-08-29 RX ADMIN — LIDOCAINE HYDROCHLORIDE 100 MG: 20 INJECTION, SOLUTION INFILTRATION; PERINEURAL at 07:10

## 2024-08-29 ASSESSMENT — PAIN - FUNCTIONAL ASSESSMENT: PAIN_FUNCTIONAL_ASSESSMENT: NONE - DENIES PAIN

## 2024-08-29 NOTE — ANESTHESIA PRE PROCEDURE
History     Tobacco Use   • Smoking status: Former     Current packs/day: 0.00     Average packs/day: 0.5 packs/day for 30.1 years (15.0 ttl pk-yrs)     Types: Cigarettes     Start date:      Quit date: 2000     Years since quittin.6   • Smokeless tobacco: Never   Substance Use Topics   • Alcohol use: Yes     Comment: occas.                                Counseling given: Not Answered      Vital Signs (Current):   Vitals:    24 0615   BP: 133/60   Pulse: 81   Resp: 20   Temp: 36.3 °C (97.3 °F)   TempSrc: Infrared   SpO2: 100%   Weight: 61.2 kg (135 lb)   Height: 1.6 m (5' 3\")                                              BP Readings from Last 3 Encounters:   24 133/60   24 115/74   24 120/80       NPO Status: Time of last liquid consumption: 1                        Time of last solid consumption: 1800                        Date of last liquid consumption: 24                        Date of last solid food consumption: 24    BMI:   Wt Readings from Last 3 Encounters:   24 61.2 kg (135 lb)   24 63.7 kg (140 lb 6.4 oz)   24 64 kg (141 lb)     Body mass index is 23.91 kg/m².    CBC:   Lab Results   Component Value Date/Time    WBC 3.7 2023 12:00 AM    RBC 3.73 2023 12:00 AM    HGB 12.0 2023 12:00 AM    HCT 35.2 2023 12:00 AM    MCV 94 2023 12:00 AM    RDW 12.5 2023 08:44 AM     2023 12:00 AM       CMP:   Lab Results   Component Value Date/Time     2024 12:00 AM    K 4.8 2024 12:00 AM     2024 12:00 AM    CO2 25 2024 12:00 AM    BUN 14 2024 12:00 AM    CREATININE 0.83 2024 12:00 AM    GFRAA >60 2022 08:45 AM    LABGLOM 75 2024 12:00 AM    GLUCOSE 98 2024 12:00 AM    CALCIUM 9.1 2024 12:00 AM    BILITOT 0.5 2024 12:00 AM    ALKPHOS 51 2024 12:00 AM    AST 21 2024 12:00 AM    ALT 14 2024 12:00 AM       POC  Tests: No results for input(s): \"POCGLU\", \"POCNA\", \"POCK\", \"POCCL\", \"POCBUN\", \"POCHEMO\", \"POCHCT\" in the last 72 hours.    Coags: No results found for: \"PROTIME\", \"INR\", \"APTT\"    HCG (If Applicable): No results found for: \"PREGTESTUR\", \"PREGSERUM\", \"HCG\", \"HCGQUANT\"     ABGs: No results found for: \"PHART\", \"PO2ART\", \"RVK2WHS\", \"GKO2MTX\", \"BEART\", \"T0AOXWMI\"     Type & Screen (If Applicable):  No results found for: \"LABABO\"    Drug/Infectious Status (If Applicable):  No results found for: \"HIV\", \"HEPCAB\"    COVID-19 Screening (If Applicable):   Lab Results   Component Value Date/Time    COVID19 Not Detected 06/08/2020 09:00 AM           Anesthesia Evaluation  Patient summary reviewed   history of anesthetic complications: PONV.  Airway: Mallampati: II  TM distance: >3 FB   Neck ROM: full  Mouth opening: > = 3 FB   Dental:          Pulmonary:Negative Pulmonary ROS and normal exam  breath sounds clear to auscultation                             Cardiovascular:    (+) hyperlipidemia        Rhythm: regular  Rate: normal                    Neuro/Psych:               GI/Hepatic/Renal:   (+) PUD, bowel prep         ROS comment: Ulcerative colitis.   Endo/Other:    (+) hypothyroidism::..                  ROS comment: Scoliosis  Meniere's disease Abdominal: normal exam            Vascular:   + PVD, aortic or cerebral (right internal carotid artery aneurysm, right opthalmic segment).       Other Findings:         Anesthesia Plan      MAC     ASA 3     (Pt nausea this morning, took antiemetic at 5:30 am)  Induction: intravenous.      Anesthetic plan and risks discussed with patient.      Plan discussed with attending.              Chart Review:  Chart reviewed on August 29, 2024 at 7:05 AM by JARAD Cabral CRNA.  Above represents information available via shared medical record including previous anesthesia history, drug and allergy history.  This does include physical examination of patient.  Anesthetic plan risks

## 2024-08-29 NOTE — ANESTHESIA POSTPROCEDURE EVALUATION
Department of Anesthesiology  Postprocedure Note    Patient: Raisa Huff  MRN: [id number]  YOB: 1950  Date of evaluation: 8/29/2024    Procedure Summary       Date: 08/29/24 Room / Location: [address]    Anesthesia Start: 0656 Anesthesia Stop: 0732    Procedure: COLONOSCOPY BIOPSY Diagnosis:       Special screening for malignant neoplasms, colon      (Special screening for malignant neoplasms, colon [Z12.11])    Surgeons: Irvin Jha MD Responsible Provider: Brandon Rodriguez MD    Anesthesia Type: MAC ASA Status: 3            Anesthesia Type: No value filed.    Saniya Phase I: Saniya Score: 10    Saniya Phase II:      Anesthesia Post Evaluation    Patient location during evaluation: PACU  Patient participation: complete - patient participated  Level of consciousness: awake  Airway patency: patent  Nausea & Vomiting: no vomiting and nausea (patient was given zofran at 5:30 am)  Cardiovascular status: hemodynamically stable  Respiratory status: acceptable  Hydration status: euvolemic  Pain management: adequate        No notable events documented.

## 2024-08-29 NOTE — OP NOTE
Operative Note      Patient: Raisa Huff  YOB: 1950  MRN: [id number]    Date of Procedure: 8/29/2024    Pre-Op Diagnosis Codes:      * Special screening for malignant neoplasms, colon [Z12.11]    Post-Op Diagnosis: Diverticulosis coli       Procedure(s):  COLORECTAL CANCER SCREENING, NOT HIGH RISK    Surgeon(s):  Irvin Jha MD    Assistant:   Surgical Assistant: Zeina Costa RN    Anesthesia: Monitor Anesthesia Care    Estimated Blood Loss (mL): None    Complications: None none    Specimens:   * No specimens in log *    Implants:  * No implants in log *      Drains: * No LDAs found *    Findings:  Infection Present At Time Of Surgery (PATOS) (choose all levels that have infection present):  None  Other Findings: None    Detailed Description of Procedure:   74-year-old female patient, a screening colonoscopy.  History of ulcerative colitis, diagnosed in 1987.  Quiescent disease now.    Digital rectal exam: Anatomic.  The colonoscope was introduced to the anus.  It was advanced into the cecum.  The prep was good.  The mucosa was normal throughout.  Several diverticular openings in the sigmoid colon.  Biopsies as requested.  Normal hemorrhoidal cushion.  Endoscope withdrawn.    Electronically signed by Irvin Jha MD on 8/29/2024 at 7:06 AM

## 2024-08-29 NOTE — H&P
H&p reviewed. No changes.  Blood pressure 133/60, pulse 81, temperature 97.3 °F (36.3 °C), temperature source Infrared, resp. rate 20, height 1.6 m (5' 3\"), weight 61.2 kg (135 lb), SpO2 100%, not currently breastfeeding.

## 2024-09-03 ENCOUNTER — TELEPHONE (OUTPATIENT)
Dept: PRIMARY CARE CLINIC | Age: 74
End: 2024-09-03

## 2024-09-03 NOTE — TELEPHONE ENCOUNTER
PC from pt, stated she tested positive for covid Sunday pm/Monday am (took 2 different tests).  Symptoms started on Sunday -Sore throat, Headache, cough.  No fever.    Pt is asking if Paxlovid can be sent to [facility] pharmacy-[address]/    Pt instructed to take Vitamin regimen of Vitamin D, C and Zinc, Mucinex DM and Robitussin for Cough, tylenol for any headches, bodyaches, fever, plenty of rest and fluids.    Please advise.

## 2024-09-03 NOTE — TELEPHONE ENCOUNTER
Phone call to patient.  At this point in time, she is going to hold off on the Paxlovid and just treat symptomatically.  Advised her to contact the office if condition does not improve or becomes worse.  Aneurysm surgery has been rescheduled for 9/17/2024.

## 2024-09-04 LAB — SURGICAL PATHOLOGY REPORT: NORMAL

## 2024-09-05 ENCOUNTER — TELEPHONE (OUTPATIENT)
Dept: PRIMARY CARE CLINIC | Age: 74
End: 2024-09-05

## 2024-09-05 NOTE — TELEPHONE ENCOUNTER
Pc to pt with test results. Recheck fasting labs in 6 months. Pt is on the upward with her Covid .

## 2024-09-05 NOTE — TELEPHONE ENCOUNTER
----- Message from Dr. Raymond Gonzalez, DO sent at 9/5/2024  1:21 PM EDT -----  Not sure if patient was called with results from labs 8/27/2024.  Results were stable.  Cholesterol was normal at 187 and triglycerides were good at 110.  HDL 41.  LDL 94.  Thyroid function was normal.  CBC was essentially unremarkable.  Recheck fasting labs in 6 months.  Please asked patient how she is doing with the COVID infection.

## 2024-09-09 DIAGNOSIS — E03.9 HYPOTHYROIDISM, UNSPECIFIED TYPE: ICD-10-CM

## 2024-09-09 DIAGNOSIS — I65.21 CAROTID STENOSIS, ASYMPTOMATIC, RIGHT: ICD-10-CM

## 2024-09-09 DIAGNOSIS — Z00.00 MEDICARE ANNUAL WELLNESS VISIT, SUBSEQUENT: ICD-10-CM

## 2024-09-09 DIAGNOSIS — R00.2 PALPITATIONS: ICD-10-CM

## 2024-09-30 ENCOUNTER — HOSPITAL ENCOUNTER (OUTPATIENT)
Dept: GENERAL RADIOLOGY | Age: 74
Discharge: HOME | End: 2024-10-02
Payer: MEDICARE

## 2024-09-30 VITALS — HEIGHT: 63 IN | WEIGHT: 138 LBS | BODY MASS INDEX: 24.45 KG/M2

## 2024-09-30 DIAGNOSIS — Z12.31 ENCOUNTER FOR SCREENING MAMMOGRAM FOR MALIGNANT NEOPLASM OF BREAST: ICD-10-CM

## 2024-09-30 PROCEDURE — 77063 BREAST TOMOSYNTHESIS BI: CPT

## 2024-10-30 ENCOUNTER — OFFICE VISIT (OUTPATIENT)
Dept: PRIMARY CARE CLINIC | Age: 74
End: 2024-10-30

## 2024-10-30 VITALS
SYSTOLIC BLOOD PRESSURE: 112 MMHG | HEART RATE: 98 BPM | TEMPERATURE: 98.2 F | OXYGEN SATURATION: 100 % | DIASTOLIC BLOOD PRESSURE: 80 MMHG | HEIGHT: 63 IN | WEIGHT: 139 LBS | BODY MASS INDEX: 24.63 KG/M2

## 2024-10-30 DIAGNOSIS — H81.03 MENIERE'S DISEASE OF BOTH EARS: ICD-10-CM

## 2024-10-30 DIAGNOSIS — K51.90 ULCERATIVE COLITIS IN REMISSION (HCC): ICD-10-CM

## 2024-10-30 DIAGNOSIS — E87.1 HYPONATREMIA: Primary | ICD-10-CM

## 2024-10-30 DIAGNOSIS — D64.9 ANEMIA, UNSPECIFIED TYPE: ICD-10-CM

## 2024-10-30 DIAGNOSIS — E03.9 HYPOTHYROIDISM, UNSPECIFIED TYPE: ICD-10-CM

## 2024-10-30 DIAGNOSIS — I77.77 DISSECTION OF FEMORAL ARTERY (HCC): ICD-10-CM

## 2024-10-30 DIAGNOSIS — I67.1 ANEURYSM OF OPHTHALMIC ARTERY: ICD-10-CM

## 2024-10-30 RX ORDER — ASPIRIN/CALCIUM/MAG/ALUMINUM 325 MG
TABLET ORAL
COMMUNITY

## 2024-11-01 LAB
ALBUMIN: 4.2 G/DL
ALP BLD-CCNC: 68 U/L
ALT SERPL-CCNC: 25 U/L
AST SERPL-CCNC: 26 U/L
BASOPHILS ABSOLUTE: 0.03 /ΜL
BASOPHILS RELATIVE PERCENT: 0.8 %
BILIRUB SERPL-MCNC: 0.4 MG/DL (ref 0.1–1.4)
BUN BLDV-MCNC: 16 MG/DL
CALCIUM SERPL-MCNC: 9.5 MG/DL
CHLORIDE BLD-SCNC: 104 MMOL/L
CO2: 24 MMOL/L
CREAT SERPL-MCNC: 0.83 MG/DL
EOSINOPHILS ABSOLUTE: 0.18 /ΜL
EOSINOPHILS RELATIVE PERCENT: 5.1 %
GLUCOSE FASTING: 92 MG/DL
HCT VFR BLD CALC: 32.9 % (ref 36–46)
HEMOGLOBIN: 10.4 G/DL (ref 12–16)
LYMPHOCYTES ABSOLUTE: 1.24 /ΜL
LYMPHOCYTES RELATIVE PERCENT: 34.9 %
MAGNESIUM: 2 MG/DL
MCH RBC QN AUTO: 31.4 PG
MCHC RBC AUTO-ENTMCNC: 31.6 G/DL
MCV RBC AUTO: 99.4 FL
MONOCYTES ABSOLUTE: 0.39 /ΜL
MONOCYTES RELATIVE PERCENT: 11 %
NEUTROPHILS ABSOLUTE: 1.7 /ΜL
NEUTROPHILS RELATIVE PERCENT: 47.9 %
PDW BLD-RTO: 13 %
PLATELET # BLD: 181 K/ΜL
PMV BLD AUTO: 11.2 FL
POTASSIUM SERPL-SCNC: 4.1 MMOL/L
RBC # BLD: 3.31 10^6/ΜL
SODIUM BLD-SCNC: 140 MMOL/L
TOTAL PROTEIN: 6.8 G/DL (ref 6.4–8.2)
WBC # BLD: 3.55 10^3/ML

## 2024-11-03 PROBLEM — I77.3 FIBROMUSCULAR DYSPLASIA (HCC): Status: ACTIVE | Noted: 2024-09-22

## 2024-11-03 PROBLEM — E03.9 HYPOTHYROIDISM: Status: ACTIVE | Noted: 2024-08-26

## 2024-11-03 PROBLEM — E78.5 HYPERLIPIDEMIA: Status: ACTIVE | Noted: 2024-08-26

## 2024-11-03 PROBLEM — I10 PRIMARY HYPERTENSION: Status: ACTIVE | Noted: 2024-09-17

## 2024-11-03 PROBLEM — I67.1 ANEURYSM, CEREBRAL, NONRUPTURED: Status: ACTIVE | Noted: 2024-04-23

## 2024-11-03 PROBLEM — I77.77: Status: ACTIVE | Noted: 2024-10-15

## 2024-11-04 RX ORDER — HYDROCHLOROTHIAZIDE 12.5 MG/1
12.5 CAPSULE ORAL EVERY MORNING
Qty: 90 CAPSULE | Refills: 1 | Status: SHIPPED | OUTPATIENT
Start: 2024-11-04

## 2024-11-08 ENCOUNTER — TELEPHONE (OUTPATIENT)
Dept: PRIMARY CARE CLINIC | Age: 74
End: 2024-11-08

## 2024-11-08 NOTE — TELEPHONE ENCOUNTER
----- Message from Dr. Raymond Gonzalez DO sent at 11/8/2024  2:41 PM EST -----  Labs reviewed.  CMP was within normal limits.  Magnesium level was normal at 2.0.  Hemoglobin was 10.4 and hematocrit 32.9 which are improving.  Follow-up 11/12/2024.

## 2024-11-12 ENCOUNTER — OFFICE VISIT (OUTPATIENT)
Dept: PRIMARY CARE CLINIC | Age: 74
End: 2024-11-12

## 2024-11-12 VITALS
TEMPERATURE: 97.8 F | OXYGEN SATURATION: 98 % | HEIGHT: 63 IN | BODY MASS INDEX: 25.34 KG/M2 | SYSTOLIC BLOOD PRESSURE: 120 MMHG | HEART RATE: 84 BPM | WEIGHT: 143 LBS | DIASTOLIC BLOOD PRESSURE: 82 MMHG

## 2024-11-12 DIAGNOSIS — J06.9 UPPER RESPIRATORY TRACT INFECTION, UNSPECIFIED TYPE: ICD-10-CM

## 2024-11-12 DIAGNOSIS — E78.2 MIXED HYPERLIPIDEMIA: ICD-10-CM

## 2024-11-12 DIAGNOSIS — H81.03 MENIERE'S DISEASE OF BOTH EARS: ICD-10-CM

## 2024-11-12 DIAGNOSIS — E87.1 HYPONATREMIA: Primary | ICD-10-CM

## 2024-11-12 DIAGNOSIS — J30.9 ALLERGIC RHINITIS, UNSPECIFIED SEASONALITY, UNSPECIFIED TRIGGER: ICD-10-CM

## 2024-11-12 DIAGNOSIS — D64.9 ANEMIA, UNSPECIFIED TYPE: ICD-10-CM

## 2024-11-12 RX ORDER — AZELASTINE 1 MG/ML
1 SPRAY, METERED NASAL 2 TIMES DAILY
Qty: 30 ML | Refills: 5 | Status: SHIPPED | OUTPATIENT
Start: 2024-11-12

## 2024-11-12 ASSESSMENT — ENCOUNTER SYMPTOMS
SHORTNESS OF BREATH: 0
PHOTOPHOBIA: 0
BLOOD IN STOOL: 0
CONSTIPATION: 0
ABDOMINAL DISTENTION: 0
COLOR CHANGE: 0
FACIAL SWELLING: 0
EYE PAIN: 0
COUGH: 0
VOMITING: 0
WHEEZING: 0
NAUSEA: 0
EYE ITCHING: 0
DIARRHEA: 0
SORE THROAT: 0
ABDOMINAL PAIN: 0
RHINORRHEA: 0
EYE DISCHARGE: 0

## 2024-11-12 NOTE — PROGRESS NOTES
08/26/2024       A1C  No results found for: \"LABA1C\"    LIPID  Lab Results   Component Value Date    CHOL 187 08/26/2024    TRIG 110 08/26/2024    HDL 71 (A) 08/26/2024    VLDL 22 08/26/2024    CHOLHDLRATIO 2.63 08/26/2024        No results found for this visit on 11/12/24.    An electronic signature was used to authenticate this note.    --Raymond Gonzalez, DO

## 2024-11-13 ENCOUNTER — TELEPHONE (OUTPATIENT)
Dept: PRIMARY CARE CLINIC | Age: 74
End: 2024-11-13

## 2024-11-13 ASSESSMENT — ENCOUNTER SYMPTOMS
VOICE CHANGE: 1
SINUS PRESSURE: 0

## 2024-11-13 NOTE — TELEPHONE ENCOUNTER
----- Message from Dr. Raymond Gonzalez, DO sent at 11/13/2024  6:21 AM EST -----  Please contact patient.  She was supposed to have a CBC and BMP ordered monthly in the future.  Apparently only the CBC requisition printed.  BMP was ordered this morning.

## 2024-11-13 NOTE — TELEPHONE ENCOUNTER
PC to pt informing Dr. Gonzalez placed an additional lab order for pt to have done.    Pt voiced understanding and stated she will stop by office to  order.

## 2024-12-09 DIAGNOSIS — D64.9 ANEMIA, UNSPECIFIED TYPE: ICD-10-CM

## 2024-12-09 DIAGNOSIS — E87.1 HYPONATREMIA: ICD-10-CM

## 2024-12-21 DIAGNOSIS — E03.9 HYPOTHYROIDISM, UNSPECIFIED TYPE: ICD-10-CM

## 2024-12-23 RX ORDER — LEVOTHYROXINE SODIUM 100 UG/1
100 TABLET ORAL DAILY
Qty: 90 TABLET | Refills: 1 | Status: SHIPPED | OUTPATIENT
Start: 2024-12-23

## 2024-12-23 NOTE — TELEPHONE ENCOUNTER
Name of Medication(s) Requested:  Requested Prescriptions     Pending Prescriptions Disp Refills    levothyroxine (SYNTHROID) 100 MCG tablet [Pharmacy Med Name: LEVOTHYROXINE 100 MCG TABLET] 90 tablet 1     Sig: TAKE 1 TABLET BY MOUTH EVERY DAY       Medication is on current medication list Yes    Dosage and directions were verified? Yes    Quantity verified: 90 day supply     Pharmacy Verified?  Yes    Last Appointment:  11/12/2024    Future appts:  Future Appointments   Date Time Provider Department Center   7/7/2025  2:15 PM Raymond Gonzalez DO [facility]   7/11/2025  9:30 AM SCHEDULE, [facility] [place] [facility]   7/11/2025  9:45 AM Kevin Villegas DO [place] [facility]        (If no appt send self scheduling link. .REFILLAPPT)  Scheduling request sent?     [] Yes  [x] No    Does patient need updated?  [] Yes  [x] No

## 2025-03-20 DIAGNOSIS — E78.2 MIXED HYPERLIPIDEMIA: ICD-10-CM

## 2025-03-20 RX ORDER — PRAVASTATIN SODIUM 20 MG
20 TABLET ORAL DAILY
Qty: 90 TABLET | Refills: 1 | Status: SHIPPED | OUTPATIENT
Start: 2025-03-20

## 2025-03-20 NOTE — TELEPHONE ENCOUNTER
Requested Prescriptions     Pending Prescriptions Disp Refills    pravastatin (PRAVACHOL) 20 MG tablet [Pharmacy Med Name: PRAVASTATIN SODIUM 20 MG TAB] 90 tablet 1     Sig: TAKE 1 TABLET BY MOUTH EVERY DAY       Next appt is 7/7/2025  Last appt was 11/12/2024

## 2025-05-13 DIAGNOSIS — I65.23 BILATERAL CAROTID ARTERY STENOSIS: ICD-10-CM

## 2025-05-13 PROBLEM — E78.5 HYPERLIPIDEMIA: Status: ACTIVE | Noted: 2024-08-26

## 2025-05-13 PROBLEM — I67.1 ANEURYSM OF OPHTHALMIC ARTERY (HHS-HCC): Status: ACTIVE | Noted: 2024-05-21

## 2025-05-13 PROBLEM — H81.03 MENIERE'S DISEASE OF BOTH EARS: Status: ACTIVE | Noted: 2024-05-21

## 2025-05-13 PROBLEM — I77.3 FIBROMUSCULAR DYSPLASIA: Status: ACTIVE | Noted: 2024-09-22

## 2025-05-13 PROBLEM — J30.9 ALLERGIC RHINITIS: Status: ACTIVE | Noted: 2021-07-01

## 2025-05-13 PROBLEM — I10 PRIMARY HYPERTENSION: Status: ACTIVE | Noted: 2024-09-17

## 2025-05-13 PROBLEM — I77.77: Status: ACTIVE | Noted: 2024-10-15

## 2025-05-13 PROBLEM — E03.9 HYPOTHYROIDISM: Status: ACTIVE | Noted: 2024-08-26

## 2025-05-16 ENCOUNTER — APPOINTMENT (OUTPATIENT)
Dept: VASCULAR MEDICINE | Facility: HOSPITAL | Age: 75
End: 2025-05-16
Payer: MEDICARE

## 2025-05-19 ENCOUNTER — OFFICE VISIT (OUTPATIENT)
Dept: CARDIOLOGY | Facility: HOSPITAL | Age: 75
End: 2025-05-19
Payer: MEDICARE

## 2025-05-19 ENCOUNTER — HOSPITAL ENCOUNTER (OUTPATIENT)
Dept: VASCULAR MEDICINE | Facility: HOSPITAL | Age: 75
Discharge: HOME | End: 2025-05-19
Payer: MEDICARE

## 2025-05-19 ENCOUNTER — HOSPITAL ENCOUNTER (OUTPATIENT)
Dept: RADIOLOGY | Facility: EXTERNAL LOCATION | Age: 75
Discharge: HOME | End: 2025-05-19

## 2025-05-19 VITALS
DIASTOLIC BLOOD PRESSURE: 78 MMHG | WEIGHT: 141 LBS | SYSTOLIC BLOOD PRESSURE: 145 MMHG | HEIGHT: 63 IN | HEART RATE: 92 BPM | OXYGEN SATURATION: 96 % | BODY MASS INDEX: 24.98 KG/M2

## 2025-05-19 DIAGNOSIS — I77.3 FIBROMUSCULAR DYSPLASIA: ICD-10-CM

## 2025-05-19 DIAGNOSIS — I65.23 BILATERAL CAROTID ARTERY STENOSIS: ICD-10-CM

## 2025-05-19 DIAGNOSIS — I77.77: ICD-10-CM

## 2025-05-19 DIAGNOSIS — I67.1 ANEURYSM OF OPHTHALMIC ARTERY (HHS-HCC): Primary | ICD-10-CM

## 2025-05-19 DIAGNOSIS — R42 VERTIGO: ICD-10-CM

## 2025-05-19 DIAGNOSIS — I70.90 ATHEROSCLEROSIS OF ARTERIES: ICD-10-CM

## 2025-05-19 PROBLEM — H81.09 MENIERE DISEASE: Status: ACTIVE | Noted: 2025-05-19

## 2025-05-19 PROCEDURE — 1036F TOBACCO NON-USER: CPT | Performed by: INTERNAL MEDICINE

## 2025-05-19 PROCEDURE — 99203 OFFICE O/P NEW LOW 30 MIN: CPT | Performed by: INTERNAL MEDICINE

## 2025-05-19 PROCEDURE — 3077F SYST BP >= 140 MM HG: CPT | Performed by: INTERNAL MEDICINE

## 2025-05-19 PROCEDURE — 93880 EXTRACRANIAL BILAT STUDY: CPT | Performed by: INTERNAL MEDICINE

## 2025-05-19 PROCEDURE — 99205 OFFICE O/P NEW HI 60 MIN: CPT | Performed by: INTERNAL MEDICINE

## 2025-05-19 PROCEDURE — 1159F MED LIST DOCD IN RCRD: CPT | Performed by: INTERNAL MEDICINE

## 2025-05-19 PROCEDURE — 93880 EXTRACRANIAL BILAT STUDY: CPT

## 2025-05-19 PROCEDURE — 3008F BODY MASS INDEX DOCD: CPT | Performed by: INTERNAL MEDICINE

## 2025-05-19 PROCEDURE — 3078F DIAST BP <80 MM HG: CPT | Performed by: INTERNAL MEDICINE

## 2025-05-19 RX ORDER — GLUCOSAM/CHONDRO/HERB 149/HYAL 750-100 MG
TABLET ORAL
COMMUNITY

## 2025-05-19 RX ORDER — FOLIC ACID 1 MG/1
1 TABLET ORAL DAILY
COMMUNITY

## 2025-05-19 RX ORDER — PSYLLIUM HUSK 0.4 G
1 CAPSULE ORAL
COMMUNITY

## 2025-05-19 RX ORDER — FLUTICASONE PROPIONATE 50 MCG
1 SPRAY, SUSPENSION (ML) NASAL 2 TIMES DAILY
COMMUNITY

## 2025-05-19 RX ORDER — LEVOTHYROXINE SODIUM 100 UG/1
100 TABLET ORAL DAILY
COMMUNITY

## 2025-05-19 RX ORDER — NIACIN (INOSITOL NIACINATE) 400(500MG)
CAPSULE ORAL
COMMUNITY

## 2025-05-19 RX ORDER — ASPIRIN 81 MG/1
81 TABLET ORAL DAILY
COMMUNITY

## 2025-05-19 RX ORDER — AZELASTINE 1 MG/ML
SPRAY, METERED NASAL
COMMUNITY

## 2025-05-19 RX ORDER — ESTRADIOL 0.1 MG/G
2 CREAM VAGINAL
COMMUNITY

## 2025-05-19 RX ORDER — BISACODYL 5 MG/1
1 TABLET, COATED ORAL DAILY
COMMUNITY

## 2025-05-19 RX ORDER — ASPIRIN 325 MG
325 TABLET, DELAYED RELEASE (ENTERIC COATED) ORAL ONCE
COMMUNITY
Start: 2024-08-23 | End: 2025-05-19 | Stop reason: ALTCHOICE

## 2025-05-19 RX ORDER — LORATADINE 10 MG/1
10 TABLET ORAL DAILY PRN
COMMUNITY

## 2025-05-19 RX ORDER — PRAVASTATIN SODIUM 20 MG/1
1 TABLET ORAL
COMMUNITY
Start: 2024-07-01

## 2025-05-19 RX ORDER — MESALAMINE 1.2 G/1
1.2 TABLET, DELAYED RELEASE ORAL DAILY
COMMUNITY

## 2025-05-19 NOTE — PROGRESS NOTES
"05/19/25  12:39 PM    Vascular Medicine - FMD/Arterial Dissection Program    This 75 y.o. woman is seen for an opinion regarding recent dx of FMD.  Her niece is one of our [facility] vascular technologists. She is here today with her .    She has Meniere's disease with periodic dizziness lasting > 20 years.  She had severe vertigo in 2.2023 then again in the winter of 5684-3031 with episodes of dizziness, nausea, vomiting, balance disturbance. 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 Subsequent CTA found ICA FMD in 8.2024 (see below) of the ICA and vertebral arteries    She underwent catheter angiography 9.21.2024 with coiling of a right ophthalmic artery aneurysm by Dr. Quinn. Post procedurally, she developed right leg claudication and was found to have a right CFA dissection treated with patch angioplasty repair by Dr. Arthur 10.15.2025. She has subsequently recovered. She is here to confirm dx of FMD and determine next steps.    No  major headaches  She has non pulsatile tinnitus primarily; rare pulsatile tinnitus; rj hearing loss  No neck pain  No hx of TIA, stroke, retinal ischemia  No Luis's  No known cervical dissection    She has no Hx HTN was on hydrochlorothiazide for Meniere's but stopped due to electrolyte disturbances  No chest pain, no hx of MI/SCAD  No post prandial abdominal pain  No claudication of arms or legs aside from leg claudication post procedure  No mesenteric ischemia or unintentional weight loss; she has ulcerative colitis (in remission for years)  No known abdominal or carotid bruits    She is doing fine on low dose aspirin  She has hyperlipidemia; on pravastatin with last in 90s.    Her episodes of vertigo are very limiting and she often has balance problems, sensation of shifting of visual field or ground    Problem List[1]    Medical " "History[2]    Surgical History[3]    Current Medications[4]    Allergies[5]      Tobacco Use: Low Risk  (5/19/2025)    Patient History     Smoking Tobacco Use: Never     Smokeless Tobacco Use: Never     Passive Exposure: Not on file   Recent Concern: Tobacco Use - Medium Risk (3/31/2025)    Received from [facility]    Patient History     Smoking Tobacco Use: Former     Smokeless Tobacco Use: Never     Passive Exposure: Not on file   She is a retired nurse      Family hx positive for multiple uncles (paternal) with intracranial and/or aortic aneurysm and 1 uncle may have had a carotid dissection/pseudoaneurysm. 3 uncles affect.    ROS as above    On examination:  Patient Vitals for the past 24 hrs:   BP Pulse SpO2 Height Weight   05/19/25 1037 145/78 92 96 % 1.6 m (5' 3\") 64 kg (141 lb)   05/19/25 1036 147/73 -- -- -- --   HEENT no Luis's  JVP flat, no cervical bruits  +S1, S2, RRR; no m/r/g  Clear lungs  No abdominal or femoral bruits  Brisk UE/LE pulses with no deficits  No edema, ulcers or major chronic venous disease  She was alert and oriented, fluid speech and approach affect    Labs 10.2024  ALT/AST wnl  Cr 0.83  K 4.1  HgB 10.4, PLT 181K    Lipids 8.2024  Tchol 187 mg/dL, Trig 110, HDL 71, LDL 94 mg/dL    Imaging reviewed  Report of carotid  4.16.2024  Right ICA  cm/sec  Left  cn/sec    MRA head/MRI brain 4.16.2024  6 mm ophthalmic artery aneurysm    8.20.2024 CTA head/neck  ICA tortuosity right more so left  Right ophthalmic aneurysm  Left Vert V3 segs likely also beaded    10.2.2024 CTA abd/pelvis with runoff  I think right renal mid section beading, not reported  Right CFA dissection/stenosis, iatrogenic  I think celiac, SMA OK, no classical beading (some ? pseudo beading on reformats)  No abdominal aortic or visceral branch aneurysms    10.3.2024 Echo  - Exam indication: Evaluation of ventricular function following ACS   - The left ventricle is normal in size. Left ventricular " systolic function is   preserved. EF = 58 ± 5% (visual est.) Normal left ventricular diastolic function.   No significant regional wall motion abnormality.   - The right ventricle is normal in size. Right ventricular systolic function is   normal.   - There are no significant valvular abnormalities.   - Estimated right ventricular systolic pressure is likely underestimated due to a   weak or incomplete tricuspid regurgitation signal and is, at least, 20 mmHg   consistent with normal pulmonary artery pressures. Estimated right atrial pressure    is 3 mmHg based on IVC assessment.   - The patient has not had a prior CC echocardiographic exam for comparison     9.21.2024 catheter angiography - coil embolization  Reported left ICA FMD and right ophthalmic 6 mm aneurysm  **ON my review of imaging left ICA few beads, right ICA classical multifocal beading  Impression    IMPRESSION:  1. Saccular aneurysm slightly distal to the origin of the right  ophthalmic artery, it appears to be narrow neck and it measures 6 mm (AP)  x 5 mm (CC) x 4.5 mm (TV).  2. Successful primary coil embolization of the patient's right  para ophthalmic aneurysm, with minimal residual filling (Mika-Jesus  Classification II).  3. No additional aneurysms identified on this study.     Right ICA Angiogram 9.17.2024 classical multifocal FMD    9.25.2024 MARY  Right 0.87  Left 1.20  Post femoral endarterectomy/patch went up to 1.18 on right (1.16 on left unchanged)    9.25.2024 arterial duplex  Stenosis right CFA  cm/sec     Today's carotid duplex  Turbulence of flow mid distal ICAs bilaterally with tortuosity  Right ICA  cm/sec  Left ICA  cm/sec    Assessment/Plan: 75 y.o. woman with episodic severe vertigo -- imaging to work up same led to what was likely an incidental finding of large ophthalmic (right) aneurysm that has been successfully coiled.  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 She had a right CFA dissection post angiography now successfully treated with open surgical repair.    She is doing well overall. We touched on many things:    I agree with the dx of multifocal FMD.  We have a good baseline carotid duplex today; she will have MRA head later this year with Dr. Quinn for follow-up of the IC aneurysm.  Her BP is currently fine; any renal FMD she has is not causing sx.  I do not think her vertigo is related to FMD -- FMD related dizziness is a very different pattern. I think she should undergo vestibular evaluation -- perhaps she would benefit from vestibular Rx. At this point, I think OK to drop aspirin to 81 mg/day.    She needs imaging of the thoracic aorta; let's plan a calcium score which will also assess for occult plaque given her hyperlipidemia.  If plaque present, would target LDL to < 70 mg/dL.    She has striking family hx of aneurysmal disease in uncles (aorta, brain); we will be monitoring her vasculature. Will check thoracic aorta as above.    I would like to obtain a baseline renal duplex later this fall.    RTC 6 months with MRA head (and ? Neck) to be done for Dr. Quinn and renal duplex here.    I spent > 66 minutes in today's visit with significant time spent on review of outside imaging and records.    Carina Cardenas MD             [1]   Patient Active Problem List  Diagnosis    Allergic rhinitis    Aneurysm of ophthalmic artery (HHS-HCC)    Fibromuscular dysplasia (CMS-HCC)    Dissection of femoral artery (Multi)    Hyperlipidemia    Hypothyroidism    Meniere's disease of both ears    Primary hypertension    Subjective tinnitus    Ulcerative colitis in remission (Multi)    Meniere disease   [2]   Past Medical History:  Diagnosis Date    Hypothyroid    [3]   Past Surgical History:  Procedure Laterality Date    CEREBRAL EMBOLIZATION      aneurysm coiling    HERNIA REPAIR      SKIN CANCER EXCISION      MOHS    TUBAL LIGATION     [4]   Current Outpatient  Medications   Medication Sig Dispense Refill    aspirin 81 mg EC tablet Take 1 tablet (81 mg) by mouth once daily.      pravastatin (Pravachol) 20 mg tablet Take 1 tablet (20 mg) by mouth once daily.      azelastine (Astelin) 137 mcg (0.1 %) nasal spray 1 SPRAY BY NASAL ROUTE 2 TIMES DAILY USE IN EACH NOSTRIL AS DIRECTED      calcium carbonate-vitamin D3 500 mg-5 mcg (200 unit) tablet Take 1 tablet by mouth once daily.      coenzyme Q10-vitamin E 100-5 mg-unit capsule Take by mouth once daily.      estradiol (Estrace) 0.01 % (0.1 mg/gram) vaginal cream Insert 0.5 Applicatorfuls (2 g) into the vagina.      fluticasone (Flonase) 50 mcg/actuation nasal spray 1 spray twice a day.      folic acid (Folvite) 1 mg tablet Take 1 tablet (1 mg) by mouth once daily.      levothyroxine (Synthroid, Levoxyl) 100 mcg tablet Take 1 tablet (100 mcg) by mouth once daily.      Lialda 1.2 gram EC tablet Take 1 tablet (1.2 g) by mouth once daily.      loratadine (Claritin) 10 mg tablet Take 1 tablet (10 mg) by mouth once daily as needed.      multivitamin with minerals iron-free (Multivitamin 50 Plus) Take 1 tablet by mouth once daily.      omega 3-dha-epa-fish oil (Fish OiL) 1,000 (120-180) mg capsule Take by mouth once daily.      vit A/vit C/vit E/zinc/copper (PRESERVISION AREDS ORAL)        No current facility-administered medications for this visit.   [5]   Allergies  Allergen Reactions    Promethazine Hcl Other     Developed a phlebitis after IV administration; ok with oral preparation    Hydrochlorothiazide Other    Tetanus And Diphtheria Toxoids Other    Tetanus-Diphtheria Toxoids-Td Other    Cefazolin Rash    Meperidine GI Upset and Nausea And Vomiting     After pt had IV demerol became very nauseated with vomiting    Risedronate Myalgia     joint pain    Sulfa (Sulfonamide Antibiotics) Hives and Rash

## 2025-05-19 NOTE — LETTER
"May 19, 2025     Kennedy Allen DO  [address] [place] [address]    Patient: Mariela Boston   YOB: 1950   Date of Visit: 5/19/2025       Dear Dr. Kennedy Allen DO:    Thank you for referring Mariela Boston to me for evaluation. Below are my notes for this consultation.  If you have questions, please do not hesitate to call me. I look forward to following your patient along with you.       Sincerely,     Carina Cardenas MD      CC: No Recipients  ______________________________________________________________________________________    05/19/25  12:39 PM    Vascular Medicine - FMD/Arterial Dissection Program    This 75 y.o. woman is seen for an opinion regarding recent dx of FMD.  Her niece is one of our [facility] vascular technologists. She is here today with her .    She has Meniere's disease with periodic dizziness lasting > 20 years.  She had severe vertigo in 2.2023 then again in the winter of 8227-2184 with episodes of dizziness, nausea, vomiting, balance disturbance. 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 Subsequent CTA found ICA FMD in 8.2024 (see below) of the ICA and vertebral arteries    She underwent catheter angiography 9.21.2024 with coiling of a right ophthalmic artery aneurysm by Dr. Quinn. Post procedurally, she developed right leg claudication and was found to have a right CFA dissection treated with patch angioplasty repair by Dr. Arthur 10.15.2025. She has subsequently recovered. She is here to confirm dx of FMD and determine next steps.    No  major headaches  She has non pulsatile tinnitus primarily; rare pulsatile tinnitus; rj hearing loss  No neck pain  No hx of TIA, stroke, retinal ischemia  No Luis's  No known cervical dissection    She has no Hx HTN was on hydrochlorothiazide for Meniere's but stopped due to electrolyte " "disturbances  No chest pain, no hx of MI/SCAD  No post prandial abdominal pain  No claudication of arms or legs aside from leg claudication post procedure  No mesenteric ischemia or unintentional weight loss; she has ulcerative colitis (in remission for years)  No known abdominal or carotid bruits    She is doing fine on low dose aspirin  She has hyperlipidemia; on pravastatin with last in 90s.    Her episodes of vertigo are very limiting and she often has balance problems, sensation of shifting of visual field or ground    Problem List[1]    Medical History[2]    Surgical History[3]    Current Medications[4]    Allergies[5]      Tobacco Use: Low Risk  (5/19/2025)    Patient History    • Smoking Tobacco Use: Never    • Smokeless Tobacco Use: Never    • Passive Exposure: Not on file   Recent Concern: Tobacco Use - Medium Risk (3/31/2025)    Received from [facility]    Patient History    • Smoking Tobacco Use: Former    • Smokeless Tobacco Use: Never    • Passive Exposure: Not on file   She is a retired nurse      Family hx positive for multiple uncles (paternal) with intracranial and/or aortic aneurysm and 1 uncle may have had a carotid dissection/pseudoaneurysm. 3 uncles affect.    ROS as above    On examination:  Patient Vitals for the past 24 hrs:   BP Pulse SpO2 Height Weight   05/19/25 1037 145/78 92 96 % 1.6 m (5' 3\") 64 kg (141 lb)   05/19/25 1036 147/73 -- -- -- --   HEENT no Luis's  JVP flat, no cervical bruits  +S1, S2, RRR; no m/r/g  Clear lungs  No abdominal or femoral bruits  Brisk UE/LE pulses with no deficits  No edema, ulcers or major chronic venous disease  She was alert and oriented, fluid speech and approach affect    Labs 10.2024  ALT/AST wnl  Cr 0.83  K 4.1  HgB 10.4, PLT 181K    Lipids 8.2024  Tchol 187 mg/dL, Trig 110, HDL 71, LDL 94 mg/dL    Imaging reviewed  Report of carotid  4.16.2024  Right ICA  cm/sec  Left  cn/sec    MRA head/MRI brain 4.16.2024  6 mm ophthalmic " artery aneurysm    8.20.2024 CTA head/neck  ICA tortuosity right more so left  Right ophthalmic aneurysm  Left Vert V3 segs likely also beaded    10.2.2024 CTA abd/pelvis with runoff  I think right renal mid section beading, not reported  Right CFA dissection/stenosis, iatrogenic  I think celiac, SMA OK, no classical beading (some ? pseudo beading on reformats)  No abdominal aortic or visceral branch aneurysms    10.3.2024 Echo  - Exam indication: Evaluation of ventricular function following ACS   - The left ventricle is normal in size. Left ventricular systolic function is   preserved. EF = 58 ± 5% (visual est.) Normal left ventricular diastolic function.   No significant regional wall motion abnormality.   - The right ventricle is normal in size. Right ventricular systolic function is   normal.   - There are no significant valvular abnormalities.   - Estimated right ventricular systolic pressure is likely underestimated due to a   weak or incomplete tricuspid regurgitation signal and is, at least, 20 mmHg   consistent with normal pulmonary artery pressures. Estimated right atrial pressure    is 3 mmHg based on IVC assessment.   - The patient has not had a prior CC echocardiographic exam for comparison     9.21.2024 catheter angiography - coil embolization  Reported left ICA FMD and right ophthalmic 6 mm aneurysm  **ON my review of imaging left ICA few beads, right ICA classical multifocal beading  Impression    IMPRESSION:  1. Saccular aneurysm slightly distal to the origin of the right  ophthalmic artery, it appears to be narrow neck and it measures 6 mm (AP)  x 5 mm (CC) x 4.5 mm (TV).  2. Successful primary coil embolization of the patient's right  para ophthalmic aneurysm, with minimal residual filling (Mika-Jesus  Classification II).  3. No additional aneurysms identified on this study.     Right ICA Angiogram 9.17.2024 classical multifocal FMD    9.25.2024 MARY  Right 0.87  Left 1.20  Post femoral  endarterectomy/patch went up to 1.18 on right (1.16 on left unchanged)    9.25.2024 arterial duplex  Stenosis right CFA  cm/sec     Today's carotid duplex  Turbulence of flow mid distal ICAs bilaterally with tortuosity  Right ICA  cm/sec  Left ICA  cm/sec    Assessment/Plan: 75 y.o. woman with episodic severe vertigo -- imaging to work up same led to what was likely an incidental finding of large ophthalmic (right) aneurysm that has been successfully coiled.  She has evidence of bilateral ICA and vertebral and likely right renal multifocal FMD. She had a right CFA dissection post angiography now successfully treated with open surgical repair.    She is doing well overall. We touched on many things:    I agree with the dx of multifocal FMD.  We have a good baseline carotid duplex today; she will have MRA head later this year with Dr. Quinn for follow-up of the IC aneurysm.  Her BP is currently fine; any renal FMD she has is not causing sx.  I do not think her vertigo is related to FMD -- FMD related dizziness is a very different pattern. I think she should undergo vestibular evaluation -- perhaps she would benefit from vestibular Rx. At this point, I think OK to drop aspirin to 81 mg/day.    She needs imaging of the thoracic aorta; let's plan a calcium score which will also assess for occult plaque given her hyperlipidemia.  If plaque present, would target LDL to < 70 mg/dL.    She has striking family hx of aneurysmal disease in uncles (aorta, brain); we will be monitoring her vasculature. Will check thoracic aorta as above.    I would like to obtain a baseline renal duplex later this fall.    RTC 6 months with MRA head (and ? Neck) to be done for Dr. Quinn and renal duplex here.    I spent > 66 minutes in today's visit with significant time spent on review of outside imaging and records.    Carina Cardenas MD             [1]  Patient Active Problem List  Diagnosis   • Allergic rhinitis    • Aneurysm of ophthalmic artery (HHS-HCC)   • Fibromuscular dysplasia (CMS-HCC)   • Dissection of femoral artery (Multi)   • Hyperlipidemia   • Hypothyroidism   • Meniere's disease of both ears   • Primary hypertension   • Subjective tinnitus   • Ulcerative colitis in remission (Multi)   • Meniere disease   [2]  Past Medical History:  Diagnosis Date   • Hypothyroid    [3]  Past Surgical History:  Procedure Laterality Date   • CEREBRAL EMBOLIZATION      aneurysm coiling   • HERNIA REPAIR     • SKIN CANCER EXCISION      MOHS   • TUBAL LIGATION     [4]  Current Outpatient Medications   Medication Sig Dispense Refill   • aspirin 81 mg EC tablet Take 1 tablet (81 mg) by mouth once daily.     • pravastatin (Pravachol) 20 mg tablet Take 1 tablet (20 mg) by mouth once daily.     • azelastine (Astelin) 137 mcg (0.1 %) nasal spray 1 SPRAY BY NASAL ROUTE 2 TIMES DAILY USE IN EACH NOSTRIL AS DIRECTED     • calcium carbonate-vitamin D3 500 mg-5 mcg (200 unit) tablet Take 1 tablet by mouth once daily.     • coenzyme Q10-vitamin E 100-5 mg-unit capsule Take by mouth once daily.     • estradiol (Estrace) 0.01 % (0.1 mg/gram) vaginal cream Insert 0.5 Applicatorfuls (2 g) into the vagina.     • fluticasone (Flonase) 50 mcg/actuation nasal spray 1 spray twice a day.     • folic acid (Folvite) 1 mg tablet Take 1 tablet (1 mg) by mouth once daily.     • levothyroxine (Synthroid, Levoxyl) 100 mcg tablet Take 1 tablet (100 mcg) by mouth once daily.     • Lialda 1.2 gram EC tablet Take 1 tablet (1.2 g) by mouth once daily.     • loratadine (Claritin) 10 mg tablet Take 1 tablet (10 mg) by mouth once daily as needed.     • multivitamin with minerals iron-free (Multivitamin 50 Plus) Take 1 tablet by mouth once daily.     • omega 3-dha-epa-fish oil (Fish OiL) 1,000 (120-180) mg capsule Take by mouth once daily.     • vit A/vit C/vit E/zinc/copper (PRESERVISION AREDS ORAL)        No current facility-administered medications for this visit.    [5]  Allergies  Allergen Reactions   • Promethazine Hcl Other     Developed a phlebitis after IV administration; ok with oral preparation   • Hydrochlorothiazide Other   • Tetanus And Diphtheria Toxoids Other   • Tetanus-Diphtheria Toxoids-Td Other   • Cefazolin Rash   • Meperidine GI Upset and Nausea And Vomiting     After pt had IV demerol became very nauseated with vomiting   • Risedronate Myalgia     joint pain   • Sulfa (Sulfonamide Antibiotics) Hives and Rash        [1]  Patient Active Problem List  Diagnosis   • Allergic rhinitis   • Aneurysm of ophthalmic artery (HHS-HCC)   • Fibromuscular dysplasia (CMS-HCC)   • Dissection of femoral artery (Multi)   • Hyperlipidemia   • Hypothyroidism   • Meniere's disease of both ears   • Primary hypertension   • Subjective tinnitus   • Ulcerative colitis in remission (Multi)   • Meniere disease   [2]  Past Medical History:  Diagnosis Date   • Hypothyroid    [3]  Past Surgical History:  Procedure Laterality Date   • CEREBRAL EMBOLIZATION      aneurysm coiling   • HERNIA REPAIR     • SKIN CANCER EXCISION      MOHS   • TUBAL LIGATION     [4]  Current Outpatient Medications   Medication Sig Dispense Refill   • aspirin 81 mg EC tablet Take 1 tablet (81 mg) by mouth once daily.     • pravastatin (Pravachol) 20 mg tablet Take 1 tablet (20 mg) by mouth once daily.     • azelastine (Astelin) 137 mcg (0.1 %) nasal spray 1 SPRAY BY NASAL ROUTE 2 TIMES DAILY USE IN EACH NOSTRIL AS DIRECTED     • calcium carbonate-vitamin D3 500 mg-5 mcg (200 unit) tablet Take 1 tablet by mouth once daily.     • coenzyme Q10-vitamin E 100-5 mg-unit capsule Take by mouth once daily.     • estradiol (Estrace) 0.01 % (0.1 mg/gram) vaginal cream Insert 0.5 Applicatorfuls (2 g) into the vagina.     • fluticasone (Flonase) 50 mcg/actuation nasal spray 1 spray twice a day.     • folic acid (Folvite) 1 mg tablet Take 1 tablet (1 mg) by mouth once daily.     • levothyroxine (Synthroid, Levoxyl) 100 mcg  tablet Take 1 tablet (100 mcg) by mouth once daily.     • Lialda 1.2 gram EC tablet Take 1 tablet (1.2 g) by mouth once daily.     • loratadine (Claritin) 10 mg tablet Take 1 tablet (10 mg) by mouth once daily as needed.     • multivitamin with minerals iron-free (Multivitamin 50 Plus) Take 1 tablet by mouth once daily.     • omega 3-dha-epa-fish oil (Fish OiL) 1,000 (120-180) mg capsule Take by mouth once daily.     • vit A/vit C/vit E/zinc/copper (PRESERVISION AREDS ORAL)        No current facility-administered medications for this visit.   [5]  Allergies  Allergen Reactions   • Promethazine Hcl Other     Developed a phlebitis after IV administration; ok with oral preparation   • Hydrochlorothiazide Other   • Tetanus And Diphtheria Toxoids Other   • Tetanus-Diphtheria Toxoids-Td Other   • Cefazolin Rash   • Meperidine GI Upset and Nausea And Vomiting     After pt had IV demerol became very nauseated with vomiting   • Risedronate Myalgia     joint pain   • Sulfa (Sulfonamide Antibiotics) Hives and Rash

## 2025-05-19 NOTE — PATIENT INSTRUCTIONS
Nice to see you today Ms. Boston.    Decrease Aspirin to 81mg once daily  Continue other medications.    Recommend vestibular system workup.  Referral to ENT.    See you back in the fall with renal ultrasound and CT calcium score.     Carina Cardenas MD  Co-Director, Vascular Center  [facility], [facility]   Adal Guerra Master Clinician in Fibromuscular Dysplasia and Vascular Care  Professor of Medicine  [facility]

## 2025-05-22 ENCOUNTER — OFFICE VISIT (OUTPATIENT)
Dept: PRIMARY CARE CLINIC | Age: 75
End: 2025-05-22

## 2025-05-22 VITALS
DIASTOLIC BLOOD PRESSURE: 74 MMHG | TEMPERATURE: 98.2 F | SYSTOLIC BLOOD PRESSURE: 120 MMHG | HEIGHT: 63 IN | OXYGEN SATURATION: 99 % | WEIGHT: 141 LBS | BODY MASS INDEX: 24.98 KG/M2 | HEART RATE: 98 BPM

## 2025-05-22 DIAGNOSIS — R42 VERTIGO: Primary | ICD-10-CM

## 2025-05-22 DIAGNOSIS — D64.9 ANEMIA, UNSPECIFIED TYPE: ICD-10-CM

## 2025-05-22 DIAGNOSIS — E78.2 MIXED HYPERLIPIDEMIA: ICD-10-CM

## 2025-05-22 DIAGNOSIS — K51.90 ULCERATIVE COLITIS IN REMISSION (HCC): ICD-10-CM

## 2025-05-22 DIAGNOSIS — I77.3 FIBROMUSCULAR DYSPLASIA: ICD-10-CM

## 2025-05-22 DIAGNOSIS — E03.9 HYPOTHYROIDISM, UNSPECIFIED TYPE: ICD-10-CM

## 2025-05-22 RX ORDER — ATORVASTATIN CALCIUM 20 MG/1
20 TABLET, FILM COATED ORAL DAILY
Qty: 90 TABLET | Refills: 0 | Status: SHIPPED | OUTPATIENT
Start: 2025-05-22

## 2025-05-22 SDOH — ECONOMIC STABILITY: FOOD INSECURITY: WITHIN THE PAST 12 MONTHS, YOU WORRIED THAT YOUR FOOD WOULD RUN OUT BEFORE YOU GOT MONEY TO BUY MORE.: NEVER TRUE

## 2025-05-22 SDOH — ECONOMIC STABILITY: FOOD INSECURITY: WITHIN THE PAST 12 MONTHS, THE FOOD YOU BOUGHT JUST DIDN'T LAST AND YOU DIDN'T HAVE MONEY TO GET MORE.: NEVER TRUE

## 2025-05-22 ASSESSMENT — ENCOUNTER SYMPTOMS
FACIAL SWELLING: 0
NAUSEA: 0
COUGH: 0
ABDOMINAL PAIN: 0
SINUS PRESSURE: 0
SORE THROAT: 0
RHINORRHEA: 0
VOMITING: 0
WHEEZING: 0
DIARRHEA: 0
EYE DISCHARGE: 0
SHORTNESS OF BREATH: 0
BLOOD IN STOOL: 0
COLOR CHANGE: 0
PHOTOPHOBIA: 0
ABDOMINAL DISTENTION: 0
CONSTIPATION: 0
EYE ITCHING: 0
VOICE CHANGE: 0
EYE PAIN: 0

## 2025-05-22 ASSESSMENT — PATIENT HEALTH QUESTIONNAIRE - PHQ9
SUM OF ALL RESPONSES TO PHQ QUESTIONS 1-9: 0
1. LITTLE INTEREST OR PLEASURE IN DOING THINGS: NOT AT ALL
2. FEELING DOWN, DEPRESSED OR HOPELESS: NOT AT ALL

## 2025-05-22 NOTE — PROGRESS NOTES
Raisa Huff (:  1950) is a 75 y.o. female,Established patient, here for evaluation of the following chief complaint(s):  Dizziness (Pt would like a referral for vestibular rehab)      Assessment & Plan   ASSESSMENT/PLAN:  1. Vertigo  -     External Referral To Physical Therapy  2. Mixed hyperlipidemia  -     CBC; Future  -     Comprehensive Metabolic Panel, Fasting; Future  -     Lipid Panel; Future  3. Anemia, unspecified type  -     CBC; Future  -     Comprehensive Metabolic Panel, Fasting; Future  4. Hypothyroidism, unspecified type  -     TSH; Future  5. Ulcerative colitis in remission (HCC)  -     CBC; Future  -     Comprehensive Metabolic Panel, Fasting; Future  -     Lipid Panel; Future  6. Fibromuscular dysplasia          PLAN:  Continue to follow with multiple specialists.  Report from Dr. Patterson reviewed.  Labs ordered.  Rx atorvastatin 20 mg daily.  Referral placed for vestibular rehab.    : I provide continuity of care as the listed primary care practitioner and serve as the continual focal point for this patient's health care needs       Return in 7 weeks (on 2025) for AWV.         Subjective   SUBJECTIVE/OBJECTIVE:  Patient is here for a follow-up visit.  She is having issues with vertigo again.  She did see neurology at [facility].  They recommended vestibular therapy.  She does have problems with multifocal fibromuscular dysplasia but neurology did not feel this was related.  She develops nausea but no vomiting.  She also describes feelings of pulsion.  She is no longer taking HCTZ.  She was diagnosed with atherosclerosis.  Neurology wants her on a stronger statin.    She underwent Percutaneous Transcatheter Embolization with Primary Coil Embolization of Cerebral Aneurysm per Dr. Middleton 2024.  She then underwent a Right Femoral Endarterectomy 10/15/2024 due to Dissection of the Femoral Artery.    Review of Systems   Constitutional:  Negative for chills, fatigue and fever.

## 2025-05-27 ENCOUNTER — HOSPITAL ENCOUNTER (OUTPATIENT)
Dept: RADIOLOGY | Facility: EXTERNAL LOCATION | Age: 75
Discharge: HOME | End: 2025-05-27

## 2025-05-28 LAB
ALBUMIN: 4.6 G/DL
ALP BLD-CCNC: 63 U/L
ALT SERPL-CCNC: 18 U/L
AST SERPL-CCNC: 23 U/L
BASOPHILS ABSOLUTE: 0.05 /ΜL
BASOPHILS RELATIVE PERCENT: 1.1 %
BILIRUB SERPL-MCNC: 0.7 MG/DL (ref 0.1–1.4)
BUN BLDV-MCNC: 11 MG/DL
CALCIUM SERPL-MCNC: 10.1 MG/DL
CHLORIDE BLD-SCNC: 103 MMOL/L
CHOLESTEROL, TOTAL: 180 MG/DL
CHOLESTEROL/HDL RATIO: 2.77
CO2: 26 MMOL/L
CREAT SERPL-MCNC: 0.88 MG/DL
EOSINOPHILS ABSOLUTE: 0.14 /ΜL
EOSINOPHILS RELATIVE PERCENT: 3.2 %
GFR, ESTIMATED: 69
GLUCOSE FASTING: 91 MG/DL
HCT VFR BLD CALC: 36.4 % (ref 36–46)
HDLC SERPL-MCNC: 65 MG/DL (ref 35–70)
HEMOGLOBIN: 11.9 G/DL (ref 12–16)
LDL CHOLESTEROL: 94
LYMPHOCYTES ABSOLUTE: 1.65 /ΜL
LYMPHOCYTES RELATIVE PERCENT: 37.2 %
MCH RBC QN AUTO: 31.6 PG
MCHC RBC AUTO-ENTMCNC: 32.7 G/DL
MCV RBC AUTO: 96.6 FL
MONOCYTES ABSOLUTE: 0.43 /ΜL
MONOCYTES RELATIVE PERCENT: 9.7 %
NEUTROPHILS ABSOLUTE: 2.16 /ΜL
NEUTROPHILS RELATIVE PERCENT: 48.8 %
NONHDLC SERPL-MCNC: 115 MG/DL
PLATELET # BLD: 142 K/ΜL
PMV BLD AUTO: 11.8 FL
POTASSIUM SERPL-SCNC: 4.1 MMOL/L
RBC # BLD: 3.77 10^6/ΜL
SODIUM BLD-SCNC: 141 MMOL/L
TOTAL PROTEIN: 7.2 G/DL (ref 6.4–8.2)
TRIGL SERPL-MCNC: 118 MG/DL
TSH SERPL DL<=0.05 MIU/L-ACNC: 1.15 UIU/ML
VLDLC SERPL CALC-MCNC: 19 MG/DL
WBC # BLD: 4.43 10^3/ML

## 2025-06-17 DIAGNOSIS — E03.9 HYPOTHYROIDISM, UNSPECIFIED TYPE: ICD-10-CM

## 2025-06-17 RX ORDER — LEVOTHYROXINE SODIUM 100 UG/1
100 TABLET ORAL DAILY
Qty: 90 TABLET | Refills: 1 | Status: SHIPPED | OUTPATIENT
Start: 2025-06-17

## 2025-06-17 NOTE — TELEPHONE ENCOUNTER
Requested Prescriptions     Pending Prescriptions Disp Refills    levothyroxine (SYNTHROID) 100 MCG tablet [Pharmacy Med Name: LEVOTHYROXINE 100 MCG TABLET] 90 tablet 1     Sig: TAKE 1 TABLET BY MOUTH EVERY DAY       Next appt is 7/7/2025  Last appt was 5/22/2025

## 2025-07-03 SDOH — HEALTH STABILITY: PHYSICAL HEALTH: ON AVERAGE, HOW MANY MINUTES DO YOU ENGAGE IN EXERCISE AT THIS LEVEL?: 30 MIN

## 2025-07-03 SDOH — HEALTH STABILITY: PHYSICAL HEALTH: ON AVERAGE, HOW MANY DAYS PER WEEK DO YOU ENGAGE IN MODERATE TO STRENUOUS EXERCISE (LIKE A BRISK WALK)?: 6 DAYS

## 2025-07-03 ASSESSMENT — PATIENT HEALTH QUESTIONNAIRE - PHQ9
SUM OF ALL RESPONSES TO PHQ QUESTIONS 1-9: 0
1. LITTLE INTEREST OR PLEASURE IN DOING THINGS: NOT AT ALL
SUM OF ALL RESPONSES TO PHQ QUESTIONS 1-9: 0
2. FEELING DOWN, DEPRESSED OR HOPELESS: NOT AT ALL

## 2025-07-03 ASSESSMENT — LIFESTYLE VARIABLES
HOW MANY STANDARD DRINKS CONTAINING ALCOHOL DO YOU HAVE ON A TYPICAL DAY: 1 OR 2
HOW OFTEN DO YOU HAVE A DRINK CONTAINING ALCOHOL: 2-4 TIMES A MONTH
HOW OFTEN DO YOU HAVE A DRINK CONTAINING ALCOHOL: 3
HOW MANY STANDARD DRINKS CONTAINING ALCOHOL DO YOU HAVE ON A TYPICAL DAY: 1

## 2025-07-07 ENCOUNTER — OFFICE VISIT (OUTPATIENT)
Dept: PRIMARY CARE CLINIC | Age: 75
End: 2025-07-07

## 2025-07-07 VITALS
OXYGEN SATURATION: 100 % | TEMPERATURE: 98 F | WEIGHT: 142 LBS | HEIGHT: 63 IN | SYSTOLIC BLOOD PRESSURE: 118 MMHG | DIASTOLIC BLOOD PRESSURE: 70 MMHG | HEART RATE: 98 BPM | BODY MASS INDEX: 25.16 KG/M2

## 2025-07-07 DIAGNOSIS — E78.2 MIXED HYPERLIPIDEMIA: ICD-10-CM

## 2025-07-07 DIAGNOSIS — Z01.10 EVALUATION OF HEARING IMPAIRMENT: ICD-10-CM

## 2025-07-07 DIAGNOSIS — Z00.00 MEDICARE ANNUAL WELLNESS VISIT, SUBSEQUENT: Primary | ICD-10-CM

## 2025-07-07 DIAGNOSIS — E03.9 HYPOTHYROIDISM, UNSPECIFIED TYPE: ICD-10-CM

## 2025-07-07 RX ORDER — GABAPENTIN 300 MG/1
300 CAPSULE ORAL NIGHTLY
COMMUNITY
Start: 2025-06-30 | End: 2026-06-30

## 2025-07-07 ASSESSMENT — LIFESTYLE VARIABLES
HOW OFTEN DO YOU HAVE A DRINK CONTAINING ALCOHOL: 2-4 TIMES A MONTH
HOW MANY STANDARD DRINKS CONTAINING ALCOHOL DO YOU HAVE ON A TYPICAL DAY: 1 OR 2

## 2025-07-07 NOTE — PROGRESS NOTES
Medicare Annual Wellness Visit    Raisa Huff is here for Medicare AWV (Subsequent Medicare AWE Labs were obtained 5/28/2025 Dizziness comes in waves)    Assessment & Plan   Medicare annual wellness visit, subsequent  -     CBC; Future  -     Comprehensive Metabolic Panel, Fasting; Future  -     Lipid Panel; Future  Hypothyroidism, unspecified type  -     Comprehensive Metabolic Panel, Fasting; Future  -     Lipid Panel; Future  -     TSH; Future  Mixed hyperlipidemia  -     Comprehensive Metabolic Panel, Fasting; Future  -     Lipid Panel; Future  Evaluation of hearing impairment     Return in 6 months (on 1/7/2026) for Medicare Annual Wellness Visit in 1 year, Follow up.     Subjective       Patient's complete Health Risk Assessment and screening values have been reviewed and are found in Flowsheets. The following problems were reviewed today and where indicated follow up appointments were made and/or referrals ordered.                    Hearing Screen:  Do you or your family notice any trouble with your hearing that hasn't been managed with hearing aids?: (!) Yes    Interventions:  Audiology appointment scheduled for 7/22/2025.               Objective   Vitals:    07/07/25 1351   BP: 118/70   Patient Position: Sitting   Pulse: 98   Temp: 98 °F (36.7 °C)   TempSrc: Temporal   SpO2: 100%   Weight: 64.4 kg (142 lb)   Height: 1.6 m (5' 3\")      Body mass index is 25.15 kg/m².                    Allergies   Allergen Reactions    Phenergan [Promethazine Hcl] Other (See Comments)     Developed a phlebitis after IV administration; ok with oral preparation    Promethazine Hcl Other (See Comments)     Developed a phlebitis after IV administration; ok with oral preparation    Tetanus-Diphtheria Toxoids Td Other (See Comments)    Ceftizoxime Rash    Demerol Hcl [Meperidine] Nausea And Vomiting     After pt had IV demerol became very nauseated with vomiting    Risedronate Myalgia     joint pain    Sulfa Antibiotics

## 2025-07-22 ENCOUNTER — TELEPHONE (OUTPATIENT)
Dept: PRIMARY CARE CLINIC | Age: 75
End: 2025-07-22

## 2025-07-22 ENCOUNTER — OFFICE VISIT (OUTPATIENT)
Dept: ENT CLINIC | Age: 75
End: 2025-07-22
Payer: MEDICARE

## 2025-07-22 ENCOUNTER — PROCEDURE VISIT (OUTPATIENT)
Dept: AUDIOLOGY | Age: 75
End: 2025-07-22
Payer: MEDICARE

## 2025-07-22 VITALS
RESPIRATION RATE: 16 BRPM | DIASTOLIC BLOOD PRESSURE: 72 MMHG | HEART RATE: 74 BPM | WEIGHT: 143.3 LBS | OXYGEN SATURATION: 99 % | SYSTOLIC BLOOD PRESSURE: 132 MMHG | HEIGHT: 63 IN | BODY MASS INDEX: 25.39 KG/M2

## 2025-07-22 DIAGNOSIS — H90.A21 SENSORINEURAL HEARING LOSS (SNHL) OF RIGHT EAR WITH RESTRICTED HEARING OF LEFT EAR: ICD-10-CM

## 2025-07-22 DIAGNOSIS — H91.91 UNILATERAL HEARING LOSS, RIGHT: Primary | ICD-10-CM

## 2025-07-22 DIAGNOSIS — H93.11 TINNITUS OF RIGHT EAR: ICD-10-CM

## 2025-07-22 DIAGNOSIS — H81.01 MENIERES DISEASE, RIGHT: Primary | ICD-10-CM

## 2025-07-22 DIAGNOSIS — R42 VERTIGO: ICD-10-CM

## 2025-07-22 PROCEDURE — 92557 COMPREHENSIVE HEARING TEST: CPT | Performed by: AUDIOLOGIST

## 2025-07-22 PROCEDURE — 3075F SYST BP GE 130 - 139MM HG: CPT | Performed by: OTOLARYNGOLOGY

## 2025-07-22 PROCEDURE — 92567 TYMPANOMETRY: CPT | Performed by: AUDIOLOGIST

## 2025-07-22 PROCEDURE — 3078F DIAST BP <80 MM HG: CPT | Performed by: OTOLARYNGOLOGY

## 2025-07-22 PROCEDURE — 99213 OFFICE O/P EST LOW 20 MIN: CPT | Performed by: OTOLARYNGOLOGY

## 2025-07-22 PROCEDURE — 1123F ACP DISCUSS/DSCN MKR DOCD: CPT | Performed by: OTOLARYNGOLOGY

## 2025-07-22 PROCEDURE — 1159F MED LIST DOCD IN RCRD: CPT | Performed by: OTOLARYNGOLOGY

## 2025-07-22 NOTE — PROGRESS NOTES
[facility]  JULIO BaeO. Ms.Ed        Patient Name:  Raisa Huff  :  1950     CHIEF C/O:  R sided hearing loss      HISTORY OBTAINED FROM:  patient    HISTORY OF PRESENT ILLNESS:       Raisa is a 75 y.o. year old female, here today for follow up of meijanet. Vertigo varies in frequency, lasts a few minutes 5-6 times a day.     Hearing and tinnitus feel worse in right since last F/U.           Post nasal drainage, using saline daily. Tried astaline and did not notice difference. Using claratin and flonase daily without relief of symptoms. Constant throat clearing.         Past Medical History:   Diagnosis Date    COVID 2024    Diffuse cystic mastopathy     Hyperlipidemia     Meniere's disease     PONV (postoperative nausea and vomiting)     with demerol IV    Right internal carotid artery aneurysm 2024    ophthalmic segment    Scoliosis     Thoracic outlet syndrome     b/l    Thyroid disease     Ulcerative colitis (HCC)    - fibromuscular dysplasia       Past Surgical History:   Procedure Laterality Date    BRAIN ANEURYSM SURGERY  2024    COLONOSCOPY      has had several    COLONOSCOPY  2015    bx done, diverticulosis    COLONOSCOPY  2017    multiple biopsy    COLONOSCOPY N/A 2020    COLONOSCOPY WITH BIOPSY performed by Irvin Jha MD at [facility] ENDOSCOPY    COLONOSCOPY N/A 2022    COLONOSCOPY WITH BIOPSY performed by Irvin Jha MD at [facility] ENDOSCOPY    COLONOSCOPY N/A 2024    COLONOSCOPY BIOPSY performed by Irvin Jha MD at [facility] ENDOSCOPY    FEMORAL ENDARTERECTOMY  10/15/2024    HERNIA REPAIR      umbilical    MOHS SURGERY      face    SKIN CANCER EXCISION      nose    TONSILLECTOMY      TUBAL LIGATION         Current Outpatient Medications:     gabapentin (NEURONTIN) 300 MG capsule, Take 1 capsule by mouth nightly., Disp: , Rfl:     levothyroxine (SYNTHROID) 100 MCG tablet, TAKE 1 TABLET BY MOUTH EVERY DAY,

## 2025-07-22 NOTE — TELEPHONE ENCOUNTER
----- Message from Dr. Raymond Gonzalez, DO sent at 7/21/2025  5:24 PM EDT -----  Labs reviewed.  Labs are essentially unremarkable.  Recheck fasting labs in 6 months.

## 2025-07-24 ENCOUNTER — APPOINTMENT (OUTPATIENT)
Dept: RADIOLOGY | Facility: HOSPITAL | Age: 75
End: 2025-07-24
Payer: MEDICARE

## 2025-07-31 DIAGNOSIS — D64.9 ANEMIA, UNSPECIFIED TYPE: ICD-10-CM

## 2025-07-31 DIAGNOSIS — E78.2 MIXED HYPERLIPIDEMIA: ICD-10-CM

## 2025-07-31 DIAGNOSIS — K51.90 ULCERATIVE COLITIS IN REMISSION (HCC): ICD-10-CM

## 2025-07-31 DIAGNOSIS — E03.9 HYPOTHYROIDISM, UNSPECIFIED TYPE: ICD-10-CM

## 2025-08-07 ENCOUNTER — PROCEDURE VISIT (OUTPATIENT)
Dept: AUDIOLOGY | Age: 75
End: 2025-08-07

## 2025-08-07 DIAGNOSIS — H90.41 SENSORINEURAL HEARING LOSS, UNILATERAL, RIGHT EAR, WITH UNRESTRICTED HEARING ON THE CONTRALATERAL SIDE: Primary | ICD-10-CM

## 2025-08-11 ENCOUNTER — TELEPHONE (OUTPATIENT)
Dept: OTOLARYNGOLOGY | Facility: HOSPITAL | Age: 75
End: 2025-08-11
Payer: MEDICARE

## 2025-08-11 ENCOUNTER — TELEPHONE (OUTPATIENT)
Dept: AUDIOLOGY | Age: 75
End: 2025-08-11

## 2025-08-11 DIAGNOSIS — H90.3 SENSORINEURAL HEARING LOSS, ASYMMETRICAL: ICD-10-CM

## 2025-08-12 ENCOUNTER — TELEPHONE (OUTPATIENT)
Dept: AUDIOLOGY | Age: 75
End: 2025-08-12

## 2025-08-15 ENCOUNTER — TELEPHONE (OUTPATIENT)
Dept: PRIMARY CARE CLINIC | Age: 75
End: 2025-08-15

## 2025-08-15 RX ORDER — ATORVASTATIN CALCIUM 20 MG/1
20 TABLET, FILM COATED ORAL DAILY
Qty: 90 TABLET | Refills: 1 | Status: SHIPPED | OUTPATIENT
Start: 2025-08-15

## 2025-08-19 ENCOUNTER — TELEPHONE (OUTPATIENT)
Dept: AUDIOLOGY | Age: 75
End: 2025-08-19

## 2025-08-19 DIAGNOSIS — H90.41 SENSORINEURAL HEARING LOSS, UNILATERAL, RIGHT EAR, WITH UNRESTRICTED HEARING ON THE CONTRALATERAL SIDE: ICD-10-CM

## 2025-08-19 DIAGNOSIS — H81.01 MENIERE'S DISEASE OF RIGHT EAR: Primary | ICD-10-CM

## 2025-08-28 ENCOUNTER — PROCEDURE VISIT (OUTPATIENT)
Dept: AUDIOLOGY | Age: 75
End: 2025-08-28
Payer: MEDICARE

## 2025-08-28 ENCOUNTER — HOSPITAL ENCOUNTER (OUTPATIENT)
Dept: SPEECH THERAPY | Age: 75
Setting detail: THERAPIES SERIES
Discharge: HOME | End: 2025-08-28
Attending: OTOLARYNGOLOGY
Payer: MEDICARE

## 2025-08-28 DIAGNOSIS — H90.41 SENSORINEURAL HEARING LOSS, UNILATERAL, RIGHT EAR, WITH UNRESTRICTED HEARING ON THE CONTRALATERAL SIDE: Primary | ICD-10-CM

## 2025-08-28 PROCEDURE — 92626 EVAL AUD FUNCJ 1ST HOUR: CPT

## 2025-08-28 PROCEDURE — 92523 SPEECH SOUND LANG COMPREHEN: CPT

## 2025-08-29 ENCOUNTER — HOSPITAL ENCOUNTER (OUTPATIENT)
Dept: RADIOLOGY | Facility: HOSPITAL | Age: 75
Discharge: HOME | End: 2025-08-29
Payer: MEDICARE

## 2025-08-29 DIAGNOSIS — I70.90 ATHEROSCLEROSIS OF ARTERIES: ICD-10-CM

## 2025-08-29 PROCEDURE — 75571 CT HRT W/O DYE W/CA TEST: CPT

## 2025-09-15 ENCOUNTER — APPOINTMENT (OUTPATIENT)
Dept: OTOLARYNGOLOGY | Facility: CLINIC | Age: 75
End: 2025-09-15
Payer: MEDICARE

## (undated) DEVICE — CONTAINER SPEC COLL 960ML POLYPR TRIANG GRAD INTAKE/OUTPUT

## (undated) DEVICE — JELLY,LUBE,STERILE,FLIP TOP,TUBE,4-OZ: Brand: MEDLINE

## (undated) DEVICE — FORCEPS BX L240CM JAW DIA2.8MM L CAP W/ NDL MIC MESH TOOTH

## (undated) DEVICE — KENDALL 450 SERIES MONITORING FOAM ELECTRODE - RECTANGULAR SHAPE ( 3/PK): Brand: KENDALL

## (undated) DEVICE — MASK,FACE,MAXFLUIDPROTECT,SHIELD/ERLPS: Brand: MEDLINE

## (undated) DEVICE — SPONGE GZ 4IN 4IN 4 PLY N WVN AVANT

## (undated) DEVICE — 6 X 9  1.75MIL 4-WALL LABGUARD: Brand: MINIGRIP COMMERCIAL LLC

## (undated) DEVICE — Device: Brand: DEFENDO VALVE AND CONNECTOR KIT

## (undated) DEVICE — LUBRICANT SURG JELLY ST BACTER TUBE 4.25OZ

## (undated) DEVICE — TUBING, SUCTION, 1/4" X 10', STRAIGHT: Brand: MEDLINE

## (undated) DEVICE — 4-PORT MANIFOLD: Brand: NEPTUNE 2

## (undated) DEVICE — AIR/WATER CLEANING ADAPTER FOR OLYMPUS® GI ENDOSCOPE: Brand: BULLDOG®

## (undated) DEVICE — KIT BEDSIDE REVITAL OX 500ML

## (undated) DEVICE — BAG SPECIMEN BIOHAZARD 6IN X 9IN

## (undated) DEVICE — GOWN ISOLATN REG YEL M WT MULTIPLY SIDETIE LEV 2

## (undated) DEVICE — WIPES SKIN CLOTH READYPREP 9 X 10.5 IN 2% CHLORHEX GLUCONATE CHG PREOP